# Patient Record
Sex: FEMALE | Race: WHITE | NOT HISPANIC OR LATINO | Employment: UNEMPLOYED | URBAN - METROPOLITAN AREA
[De-identification: names, ages, dates, MRNs, and addresses within clinical notes are randomized per-mention and may not be internally consistent; named-entity substitution may affect disease eponyms.]

---

## 2017-10-08 LAB
INFLUENZA A ANTIGEN, POC: NEGATIVE
INFLUENZA B ANTIGEN, POC: NEGATIVE
RAPID GROUP A STREP (OHS): NEGATIVE

## 2018-08-15 ENCOUNTER — HISTORICAL (OUTPATIENT)
Dept: RADIOLOGY | Facility: HOSPITAL | Age: 58
End: 2018-08-15

## 2019-07-18 ENCOUNTER — HISTORICAL (OUTPATIENT)
Dept: INTERNAL MEDICINE | Facility: CLINIC | Age: 59
End: 2019-07-18

## 2020-12-21 ENCOUNTER — HISTORICAL (OUTPATIENT)
Dept: RADIOLOGY | Facility: HOSPITAL | Age: 60
End: 2020-12-21

## 2022-02-11 ENCOUNTER — HISTORICAL (OUTPATIENT)
Dept: RADIOLOGY | Facility: HOSPITAL | Age: 62
End: 2022-02-11

## 2022-02-11 ENCOUNTER — HISTORICAL (OUTPATIENT)
Dept: ADMINISTRATIVE | Facility: HOSPITAL | Age: 62
End: 2022-02-11

## 2022-04-10 ENCOUNTER — HISTORICAL (OUTPATIENT)
Dept: ADMINISTRATIVE | Facility: HOSPITAL | Age: 62
End: 2022-04-10
Payer: COMMERCIAL

## 2022-04-29 VITALS
BODY MASS INDEX: 28.69 KG/M2 | OXYGEN SATURATION: 97 % | HEIGHT: 65 IN | WEIGHT: 172.19 LBS | SYSTOLIC BLOOD PRESSURE: 129 MMHG | DIASTOLIC BLOOD PRESSURE: 83 MMHG

## 2022-07-23 ENCOUNTER — HOSPITAL ENCOUNTER (EMERGENCY)
Facility: HOSPITAL | Age: 62
Discharge: STILL ADMITTED OR ADMINISTRATIVE | End: 2022-07-24
Attending: FAMILY MEDICINE
Payer: COMMERCIAL

## 2022-07-23 VITALS
RESPIRATION RATE: 20 BRPM | SYSTOLIC BLOOD PRESSURE: 105 MMHG | OXYGEN SATURATION: 95 % | HEART RATE: 67 BPM | TEMPERATURE: 99 F | DIASTOLIC BLOOD PRESSURE: 63 MMHG

## 2022-07-23 DIAGNOSIS — K80.50 BILIARY COLIC: Primary | ICD-10-CM

## 2022-07-23 DIAGNOSIS — R10.11 RUQ ABDOMINAL PAIN: ICD-10-CM

## 2022-07-23 DIAGNOSIS — R74.8 ELEVATED LIVER ENZYMES: ICD-10-CM

## 2022-07-23 LAB
ALBUMIN SERPL-MCNC: 3.4 GM/DL (ref 3.4–4.8)
ALBUMIN/GLOB SERPL: 0.8 RATIO (ref 1.1–2)
ALP SERPL-CCNC: 335 UNIT/L (ref 40–150)
ALT SERPL-CCNC: 276 UNIT/L (ref 0–55)
APAP SERPL-MCNC: <17.4 UG/ML (ref 17.4–30)
APPEARANCE UR: CLEAR
APTT PPP: 29 SECONDS
AST SERPL-CCNC: 118 UNIT/L (ref 5–34)
BACTERIA #/AREA URNS AUTO: ABNORMAL /HPF
BASOPHILS # BLD AUTO: 0.06 X10(3)/MCL (ref 0–0.2)
BASOPHILS NFR BLD AUTO: 1 %
BILIRUB UR QL STRIP.AUTO: ABNORMAL MG/DL
BILIRUBIN DIRECT+TOT PNL SERPL-MCNC: 1.2 MG/DL (ref 0–0.8)
BILIRUBIN DIRECT+TOT PNL SERPL-MCNC: 3.7 MG/DL (ref 0–0.5)
BILIRUBIN DIRECT+TOT PNL SERPL-MCNC: 4.9 MG/DL
BILIRUBIN DIRECT+TOT PNL SERPL-MCNC: 4.9 MG/DL
BUN SERPL-MCNC: 19.4 MG/DL (ref 9.8–20.1)
CALCIUM SERPL-MCNC: 9.7 MG/DL (ref 8.4–10.2)
CHLORIDE SERPL-SCNC: 103 MMOL/L (ref 98–107)
CO2 SERPL-SCNC: 28 MMOL/L (ref 23–31)
COLOR UR AUTO: YELLOW
CREAT SERPL-MCNC: 0.97 MG/DL (ref 0.55–1.02)
EOSINOPHIL # BLD AUTO: 0.5 X10(3)/MCL (ref 0–0.9)
EOSINOPHIL NFR BLD AUTO: 8.3 %
ERYTHROCYTE [DISTWIDTH] IN BLOOD BY AUTOMATED COUNT: 13.2 % (ref 11.5–17)
GLOBULIN SER-MCNC: 4.3 GM/DL (ref 2.4–3.5)
GLUCOSE SERPL-MCNC: 200 MG/DL (ref 82–115)
GLUCOSE UR QL STRIP.AUTO: ABNORMAL MG/DL
HAV IGM SERPL QL IA: NONREACTIVE
HBV CORE IGM SERPL QL IA: NONREACTIVE
HBV SURFACE AG SERPL QL IA: NONREACTIVE
HCT VFR BLD AUTO: 43.3 % (ref 37–47)
HCV AB SERPL QL IA: NONREACTIVE
HGB BLD-MCNC: 13.7 GM/DL (ref 12–16)
HYALINE CASTS #/AREA URNS LPF: ABNORMAL /LPF
IMM GRANULOCYTES # BLD AUTO: 0.02 X10(3)/MCL (ref 0–0.04)
IMM GRANULOCYTES NFR BLD AUTO: 0.3 %
KETONES UR QL STRIP.AUTO: NEGATIVE MG/DL
LEUKOCYTE ESTERASE UR QL STRIP.AUTO: NEGATIVE UNIT/L
LIPASE SERPL-CCNC: 34 U/L
LYMPHOCYTES # BLD AUTO: 1.86 X10(3)/MCL (ref 0.6–4.6)
LYMPHOCYTES NFR BLD AUTO: 30.8 %
MCH RBC QN AUTO: 29.3 PG (ref 27–31)
MCHC RBC AUTO-ENTMCNC: 31.6 MG/DL (ref 33–36)
MCV RBC AUTO: 92.5 FL (ref 80–94)
MONOCYTES # BLD AUTO: 0.48 X10(3)/MCL (ref 0.1–1.3)
MONOCYTES NFR BLD AUTO: 8 %
NEUTROPHILS # BLD AUTO: 3.1 X10(3)/MCL (ref 2.1–9.2)
NEUTROPHILS NFR BLD AUTO: 51.6 %
NITRITE UR QL STRIP.AUTO: NEGATIVE
NRBC BLD AUTO-RTO: 0 %
PH UR STRIP.AUTO: 5.5 [PH]
PLATELET # BLD AUTO: 151 X10(3)/MCL (ref 130–400)
PMV BLD AUTO: 9.7 FL (ref 7.4–10.4)
POTASSIUM SERPL-SCNC: 4.5 MMOL/L (ref 3.5–5.1)
PROT SERPL-MCNC: 7.7 GM/DL (ref 5.8–7.6)
PROT UR QL STRIP.AUTO: NEGATIVE MG/DL
RBC # BLD AUTO: 4.68 X10(6)/MCL (ref 4.2–5.4)
RBC #/AREA URNS AUTO: ABNORMAL /HPF
RBC UR QL AUTO: NEGATIVE UNIT/L
SODIUM SERPL-SCNC: 140 MMOL/L (ref 136–145)
SP GR UR STRIP.AUTO: 1.04
SQUAMOUS #/AREA URNS LPF: ABNORMAL /HPF
UROBILINOGEN UR STRIP-ACNC: NORMAL MG/DL
WBC # SPEC AUTO: 6 X10(3)/MCL (ref 4.5–11.5)
WBC #/AREA URNS AUTO: ABNORMAL /HPF

## 2022-07-23 PROCEDURE — 36415 COLL VENOUS BLD VENIPUNCTURE: CPT | Performed by: FAMILY MEDICINE

## 2022-07-23 PROCEDURE — 82247 BILIRUBIN TOTAL: CPT | Performed by: FAMILY MEDICINE

## 2022-07-23 PROCEDURE — 83690 ASSAY OF LIPASE: CPT | Performed by: FAMILY MEDICINE

## 2022-07-23 PROCEDURE — 63600175 PHARM REV CODE 636 W HCPCS: Performed by: FAMILY MEDICINE

## 2022-07-23 PROCEDURE — 85025 COMPLETE CBC W/AUTO DIFF WBC: CPT | Performed by: FAMILY MEDICINE

## 2022-07-23 PROCEDURE — 85610 PROTHROMBIN TIME: CPT | Performed by: FAMILY MEDICINE

## 2022-07-23 PROCEDURE — 25000003 PHARM REV CODE 250: Performed by: FAMILY MEDICINE

## 2022-07-23 PROCEDURE — 99285 EMERGENCY DEPT VISIT HI MDM: CPT | Mod: 25

## 2022-07-23 PROCEDURE — 96361 HYDRATE IV INFUSION ADD-ON: CPT

## 2022-07-23 PROCEDURE — 80074 ACUTE HEPATITIS PANEL: CPT

## 2022-07-23 PROCEDURE — 96372 THER/PROPH/DIAG INJ SC/IM: CPT | Mod: 59 | Performed by: FAMILY MEDICINE

## 2022-07-23 PROCEDURE — 63600175 PHARM REV CODE 636 W HCPCS

## 2022-07-23 PROCEDURE — 81001 URINALYSIS AUTO W/SCOPE: CPT | Performed by: FAMILY MEDICINE

## 2022-07-23 PROCEDURE — 96365 THER/PROPH/DIAG IV INF INIT: CPT

## 2022-07-23 PROCEDURE — 96376 TX/PRO/DX INJ SAME DRUG ADON: CPT

## 2022-07-23 PROCEDURE — 25000003 PHARM REV CODE 250

## 2022-07-23 PROCEDURE — 80143 DRUG ASSAY ACETAMINOPHEN: CPT | Performed by: FAMILY MEDICINE

## 2022-07-23 PROCEDURE — 96375 TX/PRO/DX INJ NEW DRUG ADDON: CPT

## 2022-07-23 PROCEDURE — 85730 THROMBOPLASTIN TIME PARTIAL: CPT | Performed by: FAMILY MEDICINE

## 2022-07-23 PROCEDURE — 80053 COMPREHEN METABOLIC PANEL: CPT | Performed by: FAMILY MEDICINE

## 2022-07-23 RX ORDER — ONDANSETRON 2 MG/ML
4 INJECTION INTRAMUSCULAR; INTRAVENOUS
Status: COMPLETED | OUTPATIENT
Start: 2022-07-23 | End: 2022-07-23

## 2022-07-23 RX ORDER — KETOROLAC TROMETHAMINE 30 MG/ML
30 INJECTION, SOLUTION INTRAMUSCULAR; INTRAVENOUS
Status: DISCONTINUED | OUTPATIENT
Start: 2022-07-23 | End: 2022-07-23

## 2022-07-23 RX ORDER — DICYCLOMINE HYDROCHLORIDE 10 MG/ML
20 INJECTION INTRAMUSCULAR
Status: COMPLETED | OUTPATIENT
Start: 2022-07-23 | End: 2022-07-23

## 2022-07-23 RX ORDER — KETOROLAC TROMETHAMINE 30 MG/ML
15 INJECTION, SOLUTION INTRAMUSCULAR; INTRAVENOUS
Status: COMPLETED | OUTPATIENT
Start: 2022-07-23 | End: 2022-07-23

## 2022-07-23 RX ADMIN — DICYCLOMINE HYDROCHLORIDE 20 MG: 10 INJECTION, SOLUTION INTRAMUSCULAR at 08:07

## 2022-07-23 RX ADMIN — ONDANSETRON 4 MG: 2 INJECTION INTRAMUSCULAR; INTRAVENOUS at 03:07

## 2022-07-23 RX ADMIN — PROMETHAZINE HYDROCHLORIDE 12.5 MG: 25 INJECTION INTRAMUSCULAR; INTRAVENOUS at 10:07

## 2022-07-23 RX ADMIN — ONDANSETRON 4 MG: 2 INJECTION INTRAMUSCULAR; INTRAVENOUS at 07:07

## 2022-07-23 RX ADMIN — SODIUM CHLORIDE 1000 ML: 9 INJECTION, SOLUTION INTRAVENOUS at 03:07

## 2022-07-23 RX ADMIN — KETOROLAC TROMETHAMINE 15 MG: 30 INJECTION, SOLUTION INTRAMUSCULAR at 07:07

## 2022-07-23 RX ADMIN — SODIUM CHLORIDE 1000 ML: 9 INJECTION, SOLUTION INTRAVENOUS at 07:07

## 2022-07-23 NOTE — ED PROVIDER NOTES
Encounter Date: 7/23/2022       History     Chief Complaint   Patient presents with    Abdominal Pain     Pt c/o RUQ pain, fevers at hs, body aches x 6 days. Jaundice today.      62 y.o. F, w/PMHx of HTN, DM, Hyperlipidemia, presents to the ED complaining of abdominal pain. She states the pain occurred Wednesday that has progressively worsened throughout the rest of the week w/nausea/vomitig with intermittent fevers. She states she tested positive for COVID Wednesday after a home kit and initially contributed her symptoms to COVID despite getting vaccinated. The pain is mainly located in the right upper abdomen that is constant 7/10. The pain is worse with eating. The pain is not improved with tylenol/ibruprofen but improves the fever. The pt also noticed jaundice on her face and eyes w/generalized pruritis, especially in her distal extremities. She admits of passing a stone in her stool several months ago after she examined her stool after a bowel movement. She denies chills, SOB, chest pain, constipation, diarrhea, but admits to fever, dehydration, nausea, vomiting, itchiness, skin color change.     The history is provided by the patient. No  was used.     Review of patient's allergies indicates:   Allergen Reactions    Codeine Anaphylaxis    Erythromycin Nausea And Vomiting     Past Medical History:   Diagnosis Date    Diabetes mellitus     Generalized anxiety disorder     GERD (gastroesophageal reflux disease)     Hypertension     Other hyperlipidemia      History reviewed. No pertinent surgical history.  History reviewed. No pertinent family history.  Social History     Tobacco Use    Smoking status: Never Smoker    Smokeless tobacco: Never Used   Substance Use Topics    Alcohol use: Not Currently    Drug use: Never     Review of Systems   Constitutional: Positive for fever. Negative for chills and diaphoresis.   HENT: Negative for congestion, sinus pressure, sinus pain and sore  throat.    Eyes: Negative for discharge.   Respiratory: Negative for chest tightness, shortness of breath and wheezing.    Cardiovascular: Negative for chest pain, palpitations and leg swelling.   Gastrointestinal: Positive for abdominal pain, nausea and vomiting. Negative for blood in stool, constipation and diarrhea.   Genitourinary: Negative for difficulty urinating and flank pain.   Musculoskeletal: Negative for arthralgias, back pain and neck pain.   Skin:        Itchiness   Neurological: Negative for dizziness, weakness, light-headedness and headaches.   Psychiatric/Behavioral: The patient is not nervous/anxious.        Physical Exam     Initial Vitals [07/23/22 1434]   BP Pulse Resp Temp SpO2   116/75 81 17 97.2 °F (36.2 °C) 98 %      MAP       --         Physical Exam    Nursing note and vitals reviewed.  Constitutional: She appears well-developed.   HENT:   Head: Normocephalic and atraumatic.   Eyes: EOM are normal. Pupils are equal, round, and reactive to light. Scleral icterus (faint jaundice around the lateral sclera both eyes) is present.   Neck: Neck supple.   Normal range of motion.  Cardiovascular: Normal rate, regular rhythm and normal heart sounds.   Pulmonary/Chest: No respiratory distress. She has no wheezes.   Abdominal: Abdomen is soft. Bowel sounds are normal. There is abdominal tenderness (positive brenner's sign).   Musculoskeletal:         General: No tenderness or edema. Normal range of motion.      Cervical back: Normal range of motion and neck supple.     Neurological: She is alert and oriented to person, place, and time. She has normal strength. GCS score is 15. GCS eye subscore is 4. GCS verbal subscore is 5. GCS motor subscore is 6.   Skin: Skin is warm and dry. Capillary refill takes less than 2 seconds. No rash noted. No erythema.   [id number] Spot of jaundice visible in the left foreheard   Psychiatric: She has a normal mood and affect.         ED Course   Procedures  Labs Reviewed    COMPREHENSIVE METABOLIC PANEL - Abnormal; Notable for the following components:       Result Value    Glucose Level 200 (*)     Protein Total 7.7 (*)     Globulin 4.3 (*)     Albumin/Globulin Ratio 0.8 (*)     Bilirubin Total 4.9 (*)     Alkaline Phosphatase 335 (*)     Alanine Aminotransferase 276 (*)     Aspartate Aminotransferase 118 (*)     All other components within normal limits   URINALYSIS, REFLEX TO URINE CULTURE - Abnormal; Notable for the following components:    Glucose, UA 4+ (*)     Bilirubin, UA 1+ (*)     Squamous Epithelial Cells, UA Occ (*)     All other components within normal limits   CBC WITH DIFFERENTIAL - Abnormal; Notable for the following components:    MCHC 31.6 (*)     All other components within normal limits   ACETAMINOPHEN LEVEL - Abnormal; Notable for the following components:    Acetaminophen Level <17.4 (*)     All other components within normal limits   BILIRUBIN, TOTAL AND DIRECT - Abnormal; Notable for the following components:    Bilirubin Total 4.9 (*)     Bilirubin Direct 3.7 (*)     Bilirubin Indirect 1.20 (*)     All other components within normal limits   LIPASE - Normal   APTT - Normal   HEPATITIS PANEL, ACUTE - Normal   CBC W/ AUTO DIFFERENTIAL    Narrative:     The following orders were created for panel order CBC Auto Differential.  Procedure                               Abnormality         Status                     ---------                               -----------         ------                     CBC with Differential[[id number]]        Abnormal            Final result                 Please view results for these tests on the individual orders.   PROTIME-INR   EXTRA TUBES    Narrative:     The following orders were created for panel order EXTRA TUBES.  Procedure                               Abnormality         Status                     ---------                               -----------         ------                     Light Green Top Hold[[id number]]                              In process                 Lavender Top Hold[[id number]]                                In process                 Gold Top Hold[[id number]]                                    In process                   Please view results for these tests on the individual orders.   LIGHT GREEN TOP HOLD   LAVENDER TOP HOLD   GOLD TOP HOLD   PATHOLOGIST INTERPRETATION          Imaging Results          US Abdomen Limited (Final result)  Result time 07/23/22 18:07:45    Final result by Jesse Massey MD (07/23/22 18:07:45)                 Impression:      1.  Hepatomegaly and hepatic steatosis without exclusion of cirrhosis due to heterogeneity.    2.  Ill-defined hypoechoic area adjacent to the gallbladder may represent fatty.      Electronically signed by: Jesse Massey  Date:    07/23/2022  Time:    18:07             Narrative:    EXAMINATION:  US ABDOMEN LIMITED    CLINICAL HISTORY:  Right upper pain.    TECHNIQUE:  Multiple real-time transverse and longitudinal sections were performed of the right abdomen by the sonographer. Select images were submitted for review.    COMPARISON:  None available    FINDINGS:  Liver is enlarged in size and shows diffuse heterogeneous increased echogenicity suggestive of steatosis without exclusion of cirrhosis. There is an ill-defined hypoechoic area adjacent to the gallbladder which is nonspecific and may represent fatty sparing.  Hepatic maximum diameter of 20.3 cm. There was unremarkable hepatopedal flow within the portal vein.  Pancreas is obscured by overlying bowel gas.    Gallbladder is contracted with limited assessment.   The gallbladder neck is also obscured due to adjacent bowel gas.  No definite gallstones identified.  There is no definite gallbladder lumen echogenicity  indicative of sludge or cholelithiasis. Gallbladder wall thickness is within normal limits. Common bile duct caliber of 4.5 mm is within normal limits for the age.    Inferior vena cava and  abdominal aorta are not well seen    Normally located right kidney length measures 11.5 x 4 cm. Right renal corticomedullary differentiation is unremarkable. No evidence of hydronephrosis.                            4:42 PM  Reevaluated pt after giving zofran and fluids. Pt states nausea is improving but still present. Pt is comfortable and not in distress.  6:18 PM  Contacted Gen. Surgery for evaluation for possible cholecystitis. Talked w/pt for imaging results.  7:01 PM  Talked w/Gen. Surgery in-person after their evaluation. Agreed for hepatitis panel and possible admission to medicine for trending labs. Referral to GI.  8:25 PM  Talked w/pt and explained current next steps in management. Pt understands and is comfortable in the room.     Medications   promethazine (PHENERGAN) 12.5 mg in dextrose 5 % 50 mL IVPB (has no administration in time range)   sodium chloride 0.9% bolus 1,000 mL (0 mLs Intravenous Stopped 7/23/22 1615)   ondansetron injection 4 mg (4 mg Intravenous Given 7/23/22 1515)   dicyclomine injection 20 mg (20 mg Intramuscular Given 7/23/22 2000)   ketorolac injection 15 mg (15 mg Intravenous Given 7/23/22 1959)   ondansetron injection 4 mg (4 mg Intravenous Given 7/23/22 1959)   sodium chloride 0.9% bolus 1,000 mL (0 mLs Intravenous Stopped 7/23/22 2058)     Medical Decision Making:   Differential Diagnosis:   Cholecystitis   Cholelithiasis  Choledocholithiasis   ED Management:  Pt arrived to ED complaining of Abdominal pain. She was seen and evaluated in the ED. She was in no acute distress but feeling nauseous. She had areas of jaundice in the skin mainly Left forehead and small spot in her sclera in both eyes. Physical exam showed positive brenner sign. RUQ U/S was ordered for possible cholelithiasis. 1L of NS was give for hydration with zofran IV for nausea. CBC, CMP, bilirubin, UAR, Lipase, coagulation study, acetaminophen levels was ordered. Total, direct, indirect bilirubin, Liver enzymes  were elevated. General surgery was contacted for further evaluation. After evaluating pt, Gen. Surgery reported of nothing acute intervention, possible admit to medicine for trending labs or GI transfer on Tuesday if condition worsens. Hepatitis panel ordered due to elevated LFTs, which were nonreactive. Current plan is to transfer her to [facility] to trend her liver enzymes and bilirubin levels and possible discharge if levels/enzymes are not trending upwards. If they are to do ERCP to assess the bile duct.            Attending Attestation:   Physician Attestation Statement for Resident:  As the supervising MD . -: I have seen and evluated the patient performing my own independent history and physical examination.  I have reviewed the resident's documentation and agree with documentation.  61 y/o female presents with 4-5 day history of RUQ abdominal pain, worse after eating, with associated nausea and vomiting with pain radiating to the right flank.  On physical examination, RUQ abdominal tenderness, +Soto's, no rebound or guarding.  On laboratory evaluation, elevated AST/ALT/ALP/Total bili, with majority being direct concerning for an obstructive type pattern.  US obtained; non-specific.  General Surgery consulted, recommended IM abdmit with GI consult in the event the liver enzymes trend upward, would need ERCP for evaluation.  No GI at this facility for 3 days, therefore recommend transfer to a facility with GI.  Accepted to [facility] for observation admission.    Herber Stephenson MD               ED Course as of 07/23/22 2228   Sat Jul 23, 2022   1812 Patient currently in no distress.  Due to the elevated liver enzymes, ALP, and Total Bili, will consult General Surgery for evaluation. [MW]   1909 General Surgery has seen and evaluated the patient.  Please see consultation note.  Recommends that the patient be admitted to IM with GI consult in the event that enzymes elevate, patient will need an ERCP.   We do not have GI services until 7/26/22 (3 days), therefore will try and transfer the patient to a facility with GI services. [MW]   2376 Discussed with hospitalist at [facility] - will be direct bedded to hospital. [MW]      ED Course User Index  [MW] Herber Stephenson MD           3:35 PM  Pt was reevaluated after initial encounter. Pt is comfortable and is aware of current plan.   4:23 PM  Attending and I Performed bedside ultrasound to evaluate gallbladder before official ultrasound read. Pt is comfortable.  5:40PM  Pt asleep in the room. Still waiting for U/S reading   9:39 PM  Pt states feeling nauseous, given phenergan. Waiting on hospitalist response before transfer.  Clinical Impression:   Final diagnoses:  [K80.50] Biliary colic (Primary)  [R10.11] RUQ abdominal pain  [R74.8] Elevated liver enzymes          ED Disposition Condition    Transfer to Another Facility Stable              Herber Stephenson MD  07/23/22 2671

## 2022-07-24 ENCOUNTER — HOSPITAL ENCOUNTER (INPATIENT)
Facility: HOSPITAL | Age: 62
LOS: 3 days | Discharge: HOME | DRG: 443 | End: 2022-07-27
Attending: INTERNAL MEDICINE | Admitting: INTERNAL MEDICINE
Payer: COMMERCIAL

## 2022-07-24 DIAGNOSIS — E11.9 TYPE 2 DIABETES MELLITUS WITHOUT COMPLICATION, WITH LONG-TERM CURRENT USE OF INSULIN: ICD-10-CM

## 2022-07-24 DIAGNOSIS — R07.9 CHEST PAIN: ICD-10-CM

## 2022-07-24 DIAGNOSIS — K72.00 ACUTE LIVER FAILURE WITHOUT HEPATIC COMA: ICD-10-CM

## 2022-07-24 DIAGNOSIS — E78.5 HYPERLIPIDEMIA, UNSPECIFIED HYPERLIPIDEMIA TYPE: ICD-10-CM

## 2022-07-24 DIAGNOSIS — R10.9 ABDOMINAL PAIN: ICD-10-CM

## 2022-07-24 DIAGNOSIS — E66.9 OBESITY (BMI 30-39.9): ICD-10-CM

## 2022-07-24 DIAGNOSIS — R11.2 INTRACTABLE NAUSEA AND VOMITING: ICD-10-CM

## 2022-07-24 DIAGNOSIS — R74.01 TRANSAMINITIS: Primary | ICD-10-CM

## 2022-07-24 DIAGNOSIS — Z79.4 TYPE 2 DIABETES MELLITUS WITHOUT COMPLICATION, WITH LONG-TERM CURRENT USE OF INSULIN: ICD-10-CM

## 2022-07-24 DIAGNOSIS — K76.0 FATTY LIVER: ICD-10-CM

## 2022-07-24 LAB
ALBUMIN SERPL-MCNC: 2.9 GM/DL (ref 3.4–4.8)
ALBUMIN/GLOB SERPL: 1 RATIO (ref 1.1–2)
ALP SERPL-CCNC: 293 UNIT/L (ref 40–150)
ALT SERPL-CCNC: 234 UNIT/L (ref 0–55)
AST SERPL-CCNC: 134 UNIT/L (ref 5–34)
BILIRUBIN DIRECT+TOT PNL SERPL-MCNC: 4.8 MG/DL
BUN SERPL-MCNC: 15.8 MG/DL (ref 9.8–20.1)
CALCIUM SERPL-MCNC: 8.5 MG/DL (ref 8.4–10.2)
CHLORIDE SERPL-SCNC: 113 MMOL/L (ref 98–107)
CO2 SERPL-SCNC: 22 MMOL/L (ref 23–31)
CREAT SERPL-MCNC: 0.69 MG/DL (ref 0.55–1.02)
GLOBULIN SER-MCNC: 3 GM/DL (ref 2.4–3.5)
GLUCOSE SERPL-MCNC: 123 MG/DL (ref 82–115)
POCT GLUCOSE: 123 MG/DL (ref 70–110)
POCT GLUCOSE: 128 MG/DL (ref 70–110)
POCT GLUCOSE: 143 MG/DL (ref 70–110)
POCT GLUCOSE: 60 MG/DL (ref 70–110)
POCT GLUCOSE: 67 MG/DL (ref 70–110)
POCT GLUCOSE: 73 MG/DL (ref 70–110)
POTASSIUM SERPL-SCNC: 4.3 MMOL/L (ref 3.5–5.1)
PROT SERPL-MCNC: 5.9 GM/DL (ref 5.8–7.6)
SARS-COV-2 RDRP RESP QL NAA+PROBE: NEGATIVE
SODIUM SERPL-SCNC: 142 MMOL/L (ref 136–145)

## 2022-07-24 PROCEDURE — 63600175 PHARM REV CODE 636 W HCPCS: Performed by: INTERNAL MEDICINE

## 2022-07-24 PROCEDURE — 25000003 PHARM REV CODE 250: Performed by: INTERNAL MEDICINE

## 2022-07-24 PROCEDURE — 25000003 PHARM REV CODE 250: Performed by: NURSE PRACTITIONER

## 2022-07-24 PROCEDURE — 87635 SARS-COV-2 COVID-19 AMP PRB: CPT | Performed by: NURSE PRACTITIONER

## 2022-07-24 PROCEDURE — 87040 BLOOD CULTURE FOR BACTERIA: CPT | Performed by: NURSE PRACTITIONER

## 2022-07-24 PROCEDURE — 36415 COLL VENOUS BLD VENIPUNCTURE: CPT | Performed by: NURSE PRACTITIONER

## 2022-07-24 PROCEDURE — 25500020 PHARM REV CODE 255: Performed by: INTERNAL MEDICINE

## 2022-07-24 PROCEDURE — 63600175 PHARM REV CODE 636 W HCPCS: Performed by: NURSE PRACTITIONER

## 2022-07-24 PROCEDURE — 11000001 HC ACUTE MED/SURG PRIVATE ROOM

## 2022-07-24 PROCEDURE — 80053 COMPREHEN METABOLIC PANEL: CPT | Performed by: NURSE PRACTITIONER

## 2022-07-24 RX ORDER — IBUPROFEN 200 MG
16 TABLET ORAL
Status: DISCONTINUED | OUTPATIENT
Start: 2022-07-24 | End: 2022-07-27 | Stop reason: HOSPADM

## 2022-07-24 RX ORDER — ACETAMINOPHEN 325 MG/1
650 TABLET ORAL EVERY 6 HOURS PRN
Status: DISCONTINUED | OUTPATIENT
Start: 2022-07-24 | End: 2022-07-27 | Stop reason: HOSPADM

## 2022-07-24 RX ORDER — NALOXONE HCL 0.4 MG/ML
0.02 VIAL (ML) INJECTION
Status: DISCONTINUED | OUTPATIENT
Start: 2022-07-24 | End: 2022-07-27 | Stop reason: HOSPADM

## 2022-07-24 RX ORDER — ATORVASTATIN CALCIUM 40 MG/1
40 TABLET, FILM COATED ORAL NIGHTLY
COMMUNITY
End: 2022-07-27

## 2022-07-24 RX ORDER — LISINOPRIL 40 MG/1
40 TABLET ORAL DAILY
COMMUNITY

## 2022-07-24 RX ORDER — SODIUM CHLORIDE 0.9 % (FLUSH) 0.9 %
10 SYRINGE (ML) INJECTION EVERY 12 HOURS PRN
Status: DISCONTINUED | OUTPATIENT
Start: 2022-07-24 | End: 2022-07-27 | Stop reason: HOSPADM

## 2022-07-24 RX ORDER — PANTOPRAZOLE SODIUM 40 MG/1
40 TABLET, DELAYED RELEASE ORAL DAILY
Status: DISCONTINUED | OUTPATIENT
Start: 2022-07-24 | End: 2022-07-26

## 2022-07-24 RX ORDER — INSULIN GLARGINE AND LIXISENATIDE 100; 33 U/ML; UG/ML
40 INJECTION, SOLUTION SUBCUTANEOUS NIGHTLY
COMMUNITY

## 2022-07-24 RX ORDER — SIMETHICONE 80 MG
1 TABLET,CHEWABLE ORAL 4 TIMES DAILY PRN
Status: DISCONTINUED | OUTPATIENT
Start: 2022-07-24 | End: 2022-07-27 | Stop reason: HOSPADM

## 2022-07-24 RX ORDER — IBUPROFEN 200 MG
24 TABLET ORAL
Status: DISCONTINUED | OUTPATIENT
Start: 2022-07-24 | End: 2022-07-27 | Stop reason: HOSPADM

## 2022-07-24 RX ORDER — OMEPRAZOLE 40 MG/1
40 CAPSULE, DELAYED RELEASE ORAL EVERY OTHER DAY
COMMUNITY

## 2022-07-24 RX ORDER — VIT C/E/ZN/COPPR/LUTEIN/ZEAXAN 250MG-90MG
2000 CAPSULE ORAL DAILY
COMMUNITY

## 2022-07-24 RX ORDER — ENOXAPARIN SODIUM 100 MG/ML
40 INJECTION SUBCUTANEOUS EVERY 24 HOURS
Status: DISCONTINUED | OUTPATIENT
Start: 2022-07-24 | End: 2022-07-27 | Stop reason: HOSPADM

## 2022-07-24 RX ORDER — GLUCAGON 1 MG
1 KIT INJECTION
Status: DISCONTINUED | OUTPATIENT
Start: 2022-07-24 | End: 2022-07-27 | Stop reason: HOSPADM

## 2022-07-24 RX ORDER — PROMETHAZINE HYDROCHLORIDE 25 MG/ML
25 INJECTION, SOLUTION INTRAMUSCULAR; INTRAVENOUS EVERY 6 HOURS PRN
Status: DISCONTINUED | OUTPATIENT
Start: 2022-07-24 | End: 2022-07-27 | Stop reason: HOSPADM

## 2022-07-24 RX ORDER — ONDANSETRON 2 MG/ML
4 INJECTION INTRAMUSCULAR; INTRAVENOUS EVERY 6 HOURS PRN
Status: DISCONTINUED | OUTPATIENT
Start: 2022-07-24 | End: 2022-07-27 | Stop reason: HOSPADM

## 2022-07-24 RX ORDER — GLIPIZIDE 10 MG/1
20 TABLET ORAL
COMMUNITY

## 2022-07-24 RX ORDER — TALC
6 POWDER (GRAM) TOPICAL NIGHTLY PRN
Status: DISCONTINUED | OUTPATIENT
Start: 2022-07-24 | End: 2022-07-27 | Stop reason: HOSPADM

## 2022-07-24 RX ORDER — INSULIN ASPART 100 [IU]/ML
0-5 INJECTION, SOLUTION INTRAVENOUS; SUBCUTANEOUS
Status: DISCONTINUED | OUTPATIENT
Start: 2022-07-24 | End: 2022-07-27 | Stop reason: HOSPADM

## 2022-07-24 RX ORDER — DAPAGLIFLOZIN 10 MG/1
10 TABLET, FILM COATED ORAL DAILY
COMMUNITY
End: 2022-10-10 | Stop reason: ALTCHOICE

## 2022-07-24 RX ORDER — DIPHENHYDRAMINE HYDROCHLORIDE 50 MG/ML
25 INJECTION INTRAMUSCULAR; INTRAVENOUS EVERY 6 HOURS PRN
Status: DISCONTINUED | OUTPATIENT
Start: 2022-07-24 | End: 2022-07-27 | Stop reason: HOSPADM

## 2022-07-24 RX ORDER — BUSPIRONE HYDROCHLORIDE 7.5 MG/1
7.5 TABLET ORAL DAILY
COMMUNITY
End: 2022-10-10 | Stop reason: ALTCHOICE

## 2022-07-24 RX ORDER — SODIUM CHLORIDE, SODIUM LACTATE, POTASSIUM CHLORIDE, CALCIUM CHLORIDE 600; 310; 30; 20 MG/100ML; MG/100ML; MG/100ML; MG/100ML
INJECTION, SOLUTION INTRAVENOUS CONTINUOUS
Status: DISCONTINUED | OUTPATIENT
Start: 2022-07-24 | End: 2022-07-26

## 2022-07-24 RX ORDER — CETIRIZINE HYDROCHLORIDE 10 MG/1
10 TABLET ORAL DAILY
COMMUNITY

## 2022-07-24 RX ADMIN — PANTOPRAZOLE SODIUM 40 MG: 40 TABLET, DELAYED RELEASE ORAL at 10:07

## 2022-07-24 RX ADMIN — IOPAMIDOL 100 ML: 755 INJECTION, SOLUTION INTRAVENOUS at 12:07

## 2022-07-24 RX ADMIN — Medication 16 G: at 12:07

## 2022-07-24 RX ADMIN — SODIUM CHLORIDE, POTASSIUM CHLORIDE, SODIUM LACTATE AND CALCIUM CHLORIDE: 600; 310; 30; 20 INJECTION, SOLUTION INTRAVENOUS at 10:07

## 2022-07-24 RX ADMIN — PROMETHAZINE HYDROCHLORIDE 25 MG: 25 INJECTION INTRAMUSCULAR; INTRAVENOUS at 07:07

## 2022-07-24 RX ADMIN — ACETAMINOPHEN 650 MG: 325 TABLET ORAL at 07:07

## 2022-07-24 RX ADMIN — ENOXAPARIN SODIUM 40 MG: 40 INJECTION SUBCUTANEOUS at 05:07

## 2022-07-24 RX ADMIN — ACETAMINOPHEN 650 MG: 325 TABLET ORAL at 11:07

## 2022-07-24 RX ADMIN — BUSPIRONE HYDROCHLORIDE 7.5 MG: 5 TABLET ORAL at 05:07

## 2022-07-24 NOTE — H&P
"[facility] Hospital Medicine History & Physical Examination       Patient Name: Livier Jung  MRN: [id number]  Patient Class: IP- Inpatient   Admission Date: 7/24/2022   Admitting Physician: [facility]   Length of Stay: 0  Attending Physician: Dr. Parker Machado  Primary Care Provider: Primary Doctor No  Face-to-Face encounter date: 07/24/2022  Code Status:Full code  Chief Complaint: Abdominal pain, nausea, vomiting, jaundice      Patient information was obtained from patient, patient's family, past medical records and ER records.     HISTORY OF PRESENT ILLNESS:   Livier Jung is a 62 y.o. female who has a PMH which includes HTN, GERD, DM, HLD; presented to the ED at [facility] on 7/23/2022 with complaints of upper epigastric abdominal pain with associated nausea with vomiting with fever of 102F. NO reports of diarrhea, cough, congestion, chest pain, SOB, recent travel or any sick contacts. Pt reports pain is primarily in her RUQ constant but fluctuates in intensity. No reports of any prior episodes. Surgery services consulted per ED provider at [facility] which she was evaluated by surgery team; which per their assessment and plan low suspicion for "primary gallbladder pathology given negative gallbladder findings on US, normal WBC, benign abdominal exam and cholestatic picture with out CBD dilatation". Recommendations for GI consultation and evaluation. GI services not available at [facility] at this time. Pt was transferred to [facility] for GI services and further evaluation. Pt reports her sympstom have been present since Monday 7/18/2022. It was reported per ED provider note pt took a home COVID test which she reported was positive; pt reports she took home test which was negative. She reports she has been vaccinated .Pt also reported yellowing of her eyes and skin with generalized itching. She denies any heavy alcohol use, denies any drug abuse. Denies any past history of hepatitis or exposure. Labs " significant for glucose 200, alkaline phosphatase 335, T bili 4.9, direct bili 3.7, indirect bili 1.20; , ; other indices unremarkable.  Hepatitis A, hepatitis B, hepatitis C all non-reactive. Abdominal IS revealed hepatomegaly and hepatic steatosis without exclusion of cirrhosis due to heterogeneity, ill defined hypoechoic area adjacent to the gallbladder may represent fatty. GI services consulted. Pt is admitted to hospital medicine services for further management.     PAST MEDICAL HISTORY:   HTN  HLD  DM  GERD  Anxiety    PAST SURGICAL HISTORY:   Back surgery  Neck surgery  Left elbow surgery  Hysterectomy  Tonsillectomy    ALLERGIES:   Codeine and Erythromycin    FAMILY HISTORY:   Reviewed and negative    SOCIAL HISTORY:     Social History     Tobacco Use    Smoking status: Never Smoker    Smokeless tobacco: Never Used   Substance Use Topics    Alcohol use: Not Currently        HOME MEDICATIONS:   As documented  Prior to Admission medications    Medication Sig Start Date End Date Taking? Authorizing Provider   atorvastatin (LIPITOR) 40 MG tablet Take 40 mg by mouth once daily.   Yes Historical Provider   busPIRone (BUSPAR) 7.5 MG tablet Take 7.5 mg by mouth Daily.   Yes Historical Provider   cetirizine (ZYRTEC) 10 MG tablet Take 10 mg by mouth once daily.   Yes Historical Provider   dapagliflozin (FARXIGA) 10 mg tablet Take 10 mg by mouth once daily.   Yes Historical Provider   glipiZIDE (GLUCOTROL) 10 MG tablet Take 10 mg by mouth 2 (two) times daily before meals.   Yes Historical Provider   lisinopriL (PRINIVIL,ZESTRIL) 40 MG tablet Take 40 mg by mouth once daily.   Yes Historical Provider   omeprazole (PRILOSEC) 40 MG capsule Take 40 mg by mouth once daily.   Yes Historical Provider       REVIEW OF SYSTEMS:   Except as documented, all other systems reviewed and negative     PHYSICAL EXAM:     VITAL SIGNS: 24 HRS MIN & MAX LAST   Temp  Min: 97.2 °F (36.2 °C)  Max: 98.8 °F (37.1 °C) 98.4 °F  (36.9 °C)   BP  Min: 93/53  Max: 129/71 121/73   Pulse  Min: 60  Max: 81  60   Resp  Min: 17  Max: 20 18   SpO2  Min: 93 %  Max: 98 % 96 %       General appearance: Well-developed, well-nourished appears  stated age, non-toxic female, complaints of mild abdomdinal pain, no family at the bedside  HENT: Atraumatic head. Moist mucous membranes of oral cavity.Sclera icteric  Eyes: Normal extraocular movements, PERRL.   Neck: Supple, trachea midline.   Lungs: Clear to auscultation bilaterally. No wheezing present.  Symmetrical expansion, unlabored respirations O2 saturation stable  Heart: Regular rate and rhythm.  Capillary refill brisk, peripheral pulses palpable   Abdomen: Soft, non-distended, epigastric tenderness with tenderness to right upper quadrant; no rebound tenderness, bowel sounds are normal.   Extremities: JEWELL; generalized weakness  Skin:warm and dry, mild jaundice to forehead  Neuro: Motor and sensory exams grossly intact. No focal deficits, AAOx3  Psych/mental status: Appropriate mood and affect. Responds appropriately to questions.     LABS AND IMAGING:     Recent Labs   Lab 07/23/22  1451   WBC 6.0   RBC 4.68   HGB 13.7   HCT 43.3   MCV 92.5   MCH 29.3   MCHC 31.6*   RDW 13.2      MPV 9.7       Recent Labs   Lab 07/23/22  1451 07/24/22  0606    142   K 4.5 4.3   CO2 28 22*   BUN 19.4 15.8   CREATININE 0.97 0.69   CALCIUM 9.7 8.5   ALBUMIN 3.4 2.9*   ALKPHOS 335* 293*   * 234*   * 134*   BILITOT 4.9*  4.9* 4.8*       Microbiology Results (last 7 days)     Procedure Component Value Units Date/Time    Blood Culture (site 1) [[id number]]     Order Status: Sent Specimen: Blood     Blood Culture (site 2) [[id number]]     Order Status: Sent Specimen: Blood            US Abdomen Limited  Narrative: EXAMINATION:  US ABDOMEN LIMITED    CLINICAL HISTORY:  Right upper pain.    TECHNIQUE:  Multiple real-time transverse and longitudinal sections were performed of the right abdomen by the  sonographer. Select images were submitted for review.    COMPARISON:  None available    FINDINGS:  Liver is enlarged in size and shows diffuse heterogeneous increased echogenicity suggestive of steatosis without exclusion of cirrhosis. There is an ill-defined hypoechoic area adjacent to the gallbladder which is nonspecific and may represent fatty sparing.  Hepatic maximum diameter of 20.3 cm. There was unremarkable hepatopedal flow within the portal vein.  Pancreas is obscured by overlying bowel gas.    Gallbladder is contracted with limited assessment.   The gallbladder neck is also obscured due to adjacent bowel gas.  No definite gallstones identified.  There is no definite gallbladder lumen echogenicity  indicative of sludge or cholelithiasis. Gallbladder wall thickness is within normal limits. Common bile duct caliber of 4.5 mm is within normal limits for the age.    Inferior vena cava and abdominal aorta are not well seen    Normally located right kidney length measures 11.5 x 4 cm. Right renal corticomedullary differentiation is unremarkable. No evidence of hydronephrosis.  Impression: 1.  Hepatomegaly and hepatic steatosis without exclusion of cirrhosis due to heterogeneity.    2.  Ill-defined hypoechoic area adjacent to the gallbladder may represent fatty.    Electronically signed by: Jesse Massey  Date:    07/23/2022  Time:    18:07        ASSESSMENT & PLAN:   ASSESSMENT:  Acute liver failure- without hepatic encephalopathy  Acute hepatitis- unknown etiology  Jaundice- secondary to acute hepatitis  Nausea without vomiting  Abdominal pain- RUQ  Fever-subjective likely secondary to GI etiology  Hyperbilirubinemia-likely secondary to acute liver failure/acute hepatitis  Generalized pruritis likely secondary to acute liver failure  DM type 2 with hyperglycemia  HTN-essential  Fever-subjective likely secondary to GI etiology  HLD- on statin therapy  Weakness    PLAN;  Consult GI Services appreciate assistance  and recommendations  NPO status until patient is evaluated by GI Services okay for p.o. meds  IV hydration LR @ 100  Accu-Cheks sliding scale  CT of the abdomen and pelvis without contrast  COVID PCR test for confirmation of negative status  Resume home medication as deemed necessary  Labs in the am  GI prophylaxis  PRN pain management  PRN anti-emetic therapy  PRN benadryl for itching        VTE Prophylaxis: Lovenox for DVT prophylaxis and will be advised to be as mobile as possible     Patient condition:  Fair  __________________________________________________________________________  INPATIENT LIST OF MEDICATIONS     Scheduled Meds: Lovenox 40 mg SQ, Protonix 40 mg IVP daily, see MAR  Continuous Infusions:LR @ 100  PRN Meds:.dextrose 10%, dextrose 10%, dextrose 10%, dextrose 10%, glucagon (human recombinant), glucose, glucose, glucose, glucose, insulin aspart U-100, melatonin, naloxone, ondansetron, promethazine, simethicone, sodium chloride 0.9%, sodium chloride 0.9%      IMaura FNP have reviewed and discussed the case with Dr. Parker Machado.  Please see the following addendum for further assessment and plan from there attending MD.  ZEYNEP Son   07/24/2022

## 2022-07-24 NOTE — CONSULTS
GI CONSULT      Reason for Consultation: RUQ pain, elevated liver enzymes,       HPI:   62 year old woman with htn, gerd, dm, obesity, who presented to [facility] with RUQ pain, nausea and vomiting and fever of 102.  She states she has had some discomfort over the past week, but because worse yesterday.  She went to [facility] and then was transferred to [facility].     She does state she had some dark urine about two days ago. She was found to have increased bili up to 4 in the ER.  Hep panel was negative. She had US that showed hepatic steatosis and contracted gb.  CT shows GB with pericholecystic fluid.         She denies covid positivity last week.        Review of patient's allergies indicates:   Allergen Reactions    Codeine Anaphylaxis    Erythromycin Nausea And Vomiting          Current Facility-Administered Medications   Medication Dose Route Frequency Provider Last Rate Last Admin    acetaminophen tablet 650 mg  650 mg Oral Q6H PRN Álvaro Bruce MD   650 mg at 07/24/22 1155    busPIRone split tablet 7.5 mg  7.5 mg Oral Daily Parker Machado MD        dextrose 10% bolus 125 mL  12.5 g Intravenous PRN Álvaro Hernandez MD        dextrose 10% bolus 125 mL  12.5 g Intravenous PRN Maura Downey, SVETAP        dextrose 10% bolus 250 mL  25 g Intravenous PRN Álvaro Hernandez MD        dextrose 10% bolus 250 mL  25 g Intravenous PRN Maura Downey, ZEYNEP        diphenhydrAMINE injection 25 mg  25 mg Intravenous Q6H PRN Maura Downey, ZEYNEP        enoxaparin injection 40 mg  40 mg Subcutaneous Daily ZEYNEP Son        glucagon (human recombinant) injection 1 mg  1 mg Intramuscular PRN Álvaro Hernandez MD        glucose chewable tablet 16 g  16 g Oral PRN Álvaro Hernandez MD        glucose chewable tablet 16 g  16 g Oral PRN FELIBERTO Auguste-BC   16 g at 07/24/22 1204    glucose chewable tablet 24 g  24 g Oral PRN Álvaro Hernandez MD         glucose chewable tablet 24 g  24 g Oral PRN Jennifer Reyes AGACNP-BC        insulin aspart U-100 injection 0-5 Units  0-5 Units Subcutaneous QID (AC + HS) PRN Álvaro Hernandez MD        lactated ringers infusion   Intravenous Continuous SVETA SonP 100 mL/hr at 07/24/22 1025 New Bag at 07/24/22 1025    melatonin tablet 6 mg  6 mg Oral Nightly PRN ZEYNEP Son        naloxone 0.4 mg/mL injection 0.02 mg  0.02 mg Intravenous PRN Jennifer Reyes AGACNP-BC        ondansetron injection 4 mg  4 mg Intravenous Q6H PRN ZEYNEP Son        pantoprazole EC tablet 40 mg  40 mg Oral Daily Maura Downey FNP   40 mg at 07/24/22 1025    promethazine injection 25 mg  25 mg Intramuscular Q6H PRN Álvaro Hernandez MD        simethicone chewable tablet 80 mg  1 tablet Oral QID PRN ZEYNEP Son        sodium chloride 0.9% flush 10 mL  10 mL Intravenous Q12H PRN CHELE AugusteCNP-BC        sodium chloride 0.9% flush 10 mL  10 mL Intravenous Q12H PRN ZEYNEP Son           Medications Prior to Admission   Medication Sig Dispense Refill Last Dose    atorvastatin (LIPITOR) 40 MG tablet Take 40 mg by mouth every evening.   Past Week at Unknown time    busPIRone (BUSPAR) 7.5 MG tablet Take 7.5 mg by mouth Daily.   Past Week at Unknown time    cetirizine (ZYRTEC) 10 MG tablet Take 10 mg by mouth once daily.   Past Week at Unknown time    cholecalciferol, vitamin D3, (VITAMIN D3) 25 mcg (1,000 unit) capsule Take 2,000 Units by mouth once daily.   Past Week    dapagliflozin (FARXIGA) 10 mg tablet Take 10 mg by mouth once daily.   Past Week at Unknown time    glipiZIDE (GLUCOTROL) 10 MG tablet Take 20 mg by mouth daily with breakfast.   Past Week at Unknown time    insulin glargine-lixisenatide (SOLIQUA 100/33) 100 unit-33 mcg/mL InPn pen Inject 40 Units into the skin every evening.   Past Week    lisinopriL (PRINIVIL,ZESTRIL) 40  MG tablet Take 40 mg by mouth once daily.   Past Week at Unknown time    omeprazole (PRILOSEC) 40 MG capsule Take 40 mg by mouth once daily.   Past Week at Unknown time         Past Medical History:    Past Medical History:   Diagnosis Date    Diabetes mellitus     Generalized anxiety disorder     GERD (gastroesophageal reflux disease)     Hypertension     Other hyperlipidemia         Past Surgical History:    No past surgical history on file.     Family History:    No family history on file.    Social History:    Social History     Tobacco Use    Smoking status: Never Smoker    Smokeless tobacco: Never Used   Substance Use Topics    Alcohol use: Not Currently            Review of Systems:    Review of Systems   Constitutional: Positive for fever.   HENT: Negative.    Eyes: Negative.    Respiratory: Negative.    Cardiovascular: Negative.    Gastrointestinal: Positive for abdominal pain and nausea.   Genitourinary: Negative.    Musculoskeletal: Negative.    Skin: Negative.    Neurological: Negative.    Endo/Heme/Allergies: Negative.    Psychiatric/Behavioral: Negative.          Objective:        VITALS:     Temp Readings from Last 3 Encounters:   07/24/22 98.5 °F (36.9 °C) (Oral)   07/23/22 98.8 °F (37.1 °C) (Oral)     BP Readings from Last 3 Encounters:   07/24/22 122/76   07/23/22 105/63   06/26/19 129/83     Pulse Readings from Last 3 Encounters:   07/24/22 63   07/23/22 67            Intake/Output Summary (Last 24 hours) at 7/24/2022 1558  Last data filed at 7/24/2022 1300  Gross per 24 hour   Intake 0 ml   Output --   Net 0 ml         Physical Exam  Constitutional:       Appearance: Normal appearance. She is obese.   HENT:      Head: Normocephalic and atraumatic.      Nose: Nose normal.      Mouth/Throat:      Mouth: Mucous membranes are moist.   Eyes:      Extraocular Movements: Extraocular movements intact.   Cardiovascular:      Rate and Rhythm: Normal rate.      Pulses: Normal pulses.   Pulmonary:       Effort: Pulmonary effort is normal. No respiratory distress.      Breath sounds: No wheezing or rales.   Abdominal:      General: Bowel sounds are normal.      Palpations: Abdomen is soft.      Tenderness: There is abdominal tenderness. There is no guarding or rebound.      Hernia: No hernia is present.      Comments: Pt Tender in RUQ and epigastric area.    Skin:     General: Skin is warm.   Neurological:      Mental Status: She is alert and oriented to person, place, and time.   Psychiatric:         Mood and Affect: Mood normal.         Behavior: Behavior normal.             Recent Results (from the past 48 hour(s))   Urinalysis, Reflex to Urine Culture Urine, Clean Catch    Collection Time: 07/23/22  2:50 PM    Specimen: Urine   Result Value Ref Range    Color, UA Yellow Yellow, Colorless, Other, Clear    Appearance, UA Clear Clear    Specific Gravity, UA 1.038     pH, UA 5.5 5.0, 5.5, 6.0, 6.5, 7.0, 7.5, 8.0, 8.5    Protein, UA Negative Negative, 300  mg/dL    Glucose, UA 4+ (A) Negative, Normal mg/dL    Ketones, UA Negative Negative, +1, +2, +3, +4, +5, >=160, >=80 mg/dL    Blood, UA Negative Negative unit/L    Bilirubin, UA 1+ (A) Negative mg/dL    Urobilinogen, UA Normal 0.2, 1.0, Normal mg/dL    Nitrites, UA Negative Negative    Leukocyte Esterase, UA Negative Negative, 75  unit/L    WBC, UA 0-5 None Seen, 0-2, 3-5, 0-5 /HPF    Bacteria, UA None Seen None Seen /HPF    Squamous Epithelial Cells, UA Occ (A) None Seen /HPF    Hyaline Casts, UA None Seen None Seen /lpf    RBC, UA 0-5 None Seen, 0-2, 3-5, 0-5 /HPF   Comprehensive Metabolic Panel    Collection Time: 07/23/22  2:51 PM   Result Value Ref Range    Sodium Level 140 136 - 145 mmol/L    Potassium Level 4.5 3.5 - 5.1 mmol/L    Chloride 103 98 - 107 mmol/L    Carbon Dioxide 28 23 - 31 mmol/L    Glucose Level 200 (H) 82 - 115 mg/dL    Blood Urea Nitrogen 19.4 9.8 - 20.1 mg/dL    Creatinine 0.97 0.55 - 1.02 mg/dL    Calcium Level Total 9.7 8.4 - 10.2  mg/dL    Protein Total 7.7 (H) 5.8 - 7.6 gm/dL    Albumin Level 3.4 3.4 - 4.8 gm/dL    Globulin 4.3 (H) 2.4 - 3.5 gm/dL    Albumin/Globulin Ratio 0.8 (L) 1.1 - 2.0 ratio    Bilirubin Total 4.9 (H) <=1.5 mg/dL    Alkaline Phosphatase 335 (H) 40 - 150 unit/L    Alanine Aminotransferase 276 (H) 0 - 55 unit/L    Aspartate Aminotransferase 118 (H) 5 - 34 unit/L    Estimated GFR-Non  >60 mls/min/1.73/m2   Lipase    Collection Time: 07/23/22  2:51 PM   Result Value Ref Range    Lipase Level 34 <=60 U/L   Protime-INR    Collection Time: 07/23/22  2:51 PM   Result Value Ref Range    PT 12.5 seconds    INR 0.95 0.00 - 1.30   APTT    Collection Time: 07/23/22  2:51 PM   Result Value Ref Range    PTT 29.0 <150.0 seconds   CBC with Differential    Collection Time: 07/23/22  2:51 PM   Result Value Ref Range    WBC 6.0 4.5 - 11.5 x10(3)/mcL    RBC 4.68 4.20 - 5.40 x10(6)/mcL    Hgb 13.7 12.0 - 16.0 gm/dL    Hct 43.3 37.0 - 47.0 %    MCV 92.5 80.0 - 94.0 fL    MCH 29.3 27.0 - 31.0 pg    MCHC 31.6 (L) 33.0 - 36.0 mg/dL    RDW 13.2 11.5 - 17.0 %    Platelet 151 130 - 400 x10(3)/mcL    MPV 9.7 7.4 - 10.4 fL    Neut % 51.6 %    Lymph % 30.8 %    Mono % 8.0 %    Eos % 8.3 %    Basophil % 1.0 %    Lymph # 1.86 0.6 - 4.6 x10(3)/mcL    Neut # 3.1 2.1 - 9.2 x10(3)/mcL    Mono # 0.48 0.1 - 1.3 x10(3)/mcL    Eos # 0.50 0 - 0.9 x10(3)/mcL    Baso # 0.06 0 - 0.2 x10(3)/mcL    IG# 0.02 0 - 0.04 x10(3)/mcL    IG% 0.3 %    NRBC% 0.0 %   Acetaminophen Level    Collection Time: 07/23/22  2:51 PM   Result Value Ref Range    Acetaminophen Level <17.4 (L) 17.4 - 30.0 ug/ml   Bilirubin, Total and Direct    Collection Time: 07/23/22  2:51 PM   Result Value Ref Range    Bilirubin Total 4.9 (H) <=1.5 mg/dL    Bilirubin Direct 3.7 (H) 0.0 - 0.5 mg/dL    Bilirubin Indirect 1.20 (H) 0.00 - 0.80 mg/dL   Hepatitis Panel, Acute    Collection Time: 07/23/22  2:57 PM   Result Value Ref Range    Hepatitis A IgM Nonreactive Nonreactive    Hepatitis B  Core IgM Nonreactive Nonreactive    Hepatitis B Surface Antigen Nonreactive Nonreactive    Hepatitis C Antibody Nonreactive Nonreactive   POCT glucose    Collection Time: 07/24/22  5:04 AM   Result Value Ref Range    POCT Glucose 60 (L) 70 - 110 mg/dL   Comprehensive Metabolic Panel (CMP)    Collection Time: 07/24/22  6:06 AM   Result Value Ref Range    Sodium Level 142 136 - 145 mmol/L    Potassium Level 4.3 3.5 - 5.1 mmol/L    Chloride 113 (H) 98 - 107 mmol/L    Carbon Dioxide 22 (L) 23 - 31 mmol/L    Glucose Level 123 (H) 82 - 115 mg/dL    Blood Urea Nitrogen 15.8 9.8 - 20.1 mg/dL    Creatinine 0.69 0.55 - 1.02 mg/dL    Calcium Level Total 8.5 8.4 - 10.2 mg/dL    Protein Total 5.9 5.8 - 7.6 gm/dL    Albumin Level 2.9 (L) 3.4 - 4.8 gm/dL    Globulin 3.0 2.4 - 3.5 gm/dL    Albumin/Globulin Ratio 1.0 (L) 1.1 - 2.0 ratio    Bilirubin Total 4.8 (H) <=1.5 mg/dL    Alkaline Phosphatase 293 (H) 40 - 150 unit/L    Alanine Aminotransferase 234 (H) 0 - 55 unit/L    Aspartate Aminotransferase 134 (H) 5 - 34 unit/L    Estimated GFR-Non  >60 mls/min/1.73/m2   POCT glucose    Collection Time: 07/24/22  6:20 AM   Result Value Ref Range    POCT Glucose 128 (H) 70 - 110 mg/dL   COVID-19 Rapid Screening    Collection Time: 07/24/22 10:15 AM   Result Value Ref Range    SARS COV-2 MOLECULAR Negative Negative   POCT glucose    Collection Time: 07/24/22 11:59 AM   Result Value Ref Range    POCT Glucose 67 (L) 70 - 110 mg/dL   POCT glucose    Collection Time: 07/24/22  1:12 PM   Result Value Ref Range    POCT Glucose 123 (H) 70 - 110 mg/dL           CT Abdomen Pelvis With Contrast    Result Date: 7/24/2022  EXAMINATION: CT ABDOMEN PELVIS WITH CONTRAST CLINICAL HISTORY: RUQ pain, elevated LFt;s; TECHNIQUE: Low dose axial images, sagittal and coronal reformations were obtained from the lung bases to the pubic symphysis following the IV administration of contrast COMPARISON: None. FINDINGS: The lung bases are clear. The  liver appears normal.  No liver mass or lesion is seen.  Portal and hepatic veins appear normal. The gallbladder is contracted and there is some pericholecystic fluid seen.  No obvious gallstones are seen.. The pancreas appears normal.  No pancreatic mass or lesion is seen. The spleen shows no acute abnormality. The adrenal glands appear normal.  No adrenal nodule is seen. The kidneys appear normal.  No hydronephrosis is seen.  No hydroureter is seen.  No nephrolithiasis is seen.  No obvious ureteral stones are seen. Urinary bladder appears grossly unremarkable. Reproductive structures appear grossly unremarkable. No colitis is seen.  No diverticulitis is seen.  No obvious colonic mass or lesion is seen. No free air is seen.  No free fluid is seen.     Contracted gallbladder with pericholecystic fluid is seen.  If gallbladder pathology is suspected ultrasound correlation is recommended Otherwise unremarkable Electronically signed by: Gisel Beltran Date:    07/24/2022 Time:    13:04    US Abdomen Limited    Result Date: 7/23/2022  EXAMINATION: US ABDOMEN LIMITED CLINICAL HISTORY: Right upper pain. TECHNIQUE: Multiple real-time transverse and longitudinal sections were performed of the right abdomen by the sonographer. Select images were submitted for review. COMPARISON: None available FINDINGS: Liver is enlarged in size and shows diffuse heterogeneous increased echogenicity suggestive of steatosis without exclusion of cirrhosis. There is an ill-defined hypoechoic area adjacent to the gallbladder which is nonspecific and may represent fatty sparing.  Hepatic maximum diameter of 20.3 cm. There was unremarkable hepatopedal flow within the portal vein.  Pancreas is obscured by overlying bowel gas. Gallbladder is contracted with limited assessment.   The gallbladder neck is also obscured due to adjacent bowel gas.  No definite gallstones identified.  There is no definite gallbladder lumen echogenicity  indicative of  sludge or cholelithiasis. Gallbladder wall thickness is within normal limits. Common bile duct caliber of 4.5 mm is within normal limits for the age. Inferior vena cava and abdominal aorta are not well seen Normally located right kidney length measures 11.5 x 4 cm. Right renal corticomedullary differentiation is unremarkable. No evidence of hydronephrosis.     1.  Hepatomegaly and hepatic steatosis without exclusion of cirrhosis due to heterogeneity. 2.  Ill-defined hypoechoic area adjacent to the gallbladder may represent fatty. Electronically signed by: Jesse Massey Date:    07/23/2022 Time:    18:07            Assessment & Plan:    62 year old woman with htn, gerd, dm, obesity, who presented to [facility] with RUQ pain, nausea and vomiting and fever of 102.  She states she has had some discomfort over the past week, but because worse yesterday.  She went to [facility] and then was transferred to [facility].     1. Elevated LFT's---likely secondary to gb inflammation.  Cannot rule out cbd stone yet.  Duct was not dilated, but will get MRCP.  Will trend lft's.  If she spikes a fever, low threshold to start antibx.   Check liver serology.         2. RUQ pain---pt with TTP in RUQ.  Her gb to me on CT scan looks abnormal.  I recommend surgical consultation for this.  I will order MRCP to make sure bile duct has not stones, etc.  But I suspect her symptomatology is biliary related.

## 2022-07-24 NOTE — CONSULTS
[facility] - [facility]  Consult Note     Subjective:     HPI:   61 yo F presenting with upper abdominal pain since Monday. She reports subjective nausea without emesis and fever up to 102F. Normal bowel function. No recent sick contacts, travel or hospitalizations. No prior episodes of biliary colic or similar RUQ pain.      PMH:  Past Medical History:   Diagnosis Date    Diabetes mellitus     Generalized anxiety disorder     GERD (gastroesophageal reflux disease)     Hypertension     Other hyperlipidemia          PSH:  History reviewed. No pertinent surgical history.      Medications:  No current facility-administered medications on file prior to encounter.     No current outpatient medications on file prior to encounter.        Allergies:  Review of patient's allergies indicates:   Allergen Reactions    Codeine Anaphylaxis    Erythromycin Nausea And Vomiting         Social Hx:  Social History     Tobacco Use   Smoking Status Never Smoker   Smokeless Tobacco Never Used      Social History     Substance and Sexual Activity   Alcohol Use Not Currently         Relevant Family Hx:  History reviewed. No pertinent family history.      Objective:     Physical Exam:  Gen: NAD  Neuro: awake, alert, answering questions appropriately  CV: RRR  Resp: non-labored breathing, ANDREI  Abd: soft, ND, NT  Ext: moves all 4 spontaneously and purposefully  Skin: warm, well perfused     Labs:  WBC wnl  Bili 4.9 (Direct 3.7; Indirect 1.2)  AST//118  Alk phos 335  Lipase 34       Imaging:  RUQ Ultrasound with contracted gallbladder, no stones, no pericholecystic fluid, no wall thickening, normal CBD 4.5mm     Assessment/Plan:  61 yo F presenting with acute episode of abdominal pain and fevers    - Discussed with ED attending low suspicion for primary gallbladder pathology given negative gallbladder findings on ultrasound, normal WBC, benign abdominal exam, and cholestatic picture without CBD dilation  - Strongly  recommend GI consultation and medicine evaluation. GI unavailable until Tuesday so discussed possible transfer outside this facility for further workup.   - Hepatitis panel pending  - Surgery will follow if patient admitted     Clarice Ellis MD   [facility], PGY3  07/23/2022 7:02 PM

## 2022-07-25 LAB
ALBUMIN SERPL-MCNC: 2.9 GM/DL (ref 3.4–4.8)
ALBUMIN/GLOB SERPL: 0.9 RATIO (ref 1.1–2)
ALP SERPL-CCNC: 320 UNIT/L (ref 40–150)
ALT SERPL-CCNC: 233 UNIT/L (ref 0–55)
AST SERPL-CCNC: 146 UNIT/L (ref 5–34)
BASOPHILS # BLD AUTO: 0.05 X10(3)/MCL (ref 0–0.2)
BASOPHILS NFR BLD AUTO: 0.9 %
BILIRUBIN DIRECT+TOT PNL SERPL-MCNC: 4 MG/DL
BUN SERPL-MCNC: 8.7 MG/DL (ref 9.8–20.1)
CALCIUM SERPL-MCNC: 9 MG/DL (ref 8.4–10.2)
CHLORIDE SERPL-SCNC: 110 MMOL/L (ref 98–107)
CO2 SERPL-SCNC: 26 MMOL/L (ref 23–31)
CREAT SERPL-MCNC: 0.63 MG/DL (ref 0.55–1.02)
EOSINOPHIL # BLD AUTO: 0.56 X10(3)/MCL (ref 0–0.9)
EOSINOPHIL NFR BLD AUTO: 10.5 %
ERYTHROCYTE [DISTWIDTH] IN BLOOD BY AUTOMATED COUNT: 13.8 % (ref 11.5–17)
GLOBULIN SER-MCNC: 3.1 GM/DL (ref 2.4–3.5)
GLUCOSE SERPL-MCNC: 102 MG/DL (ref 82–115)
GLUCOSE SERPL-MCNC: 121 MG/DL (ref 70–110)
GLUCOSE SERPL-MCNC: 133 MG/DL (ref 70–110)
HCT VFR BLD AUTO: 38.9 % (ref 37–47)
HGB BLD-MCNC: 12.1 GM/DL (ref 12–16)
IMM GRANULOCYTES # BLD AUTO: 0.03 X10(3)/MCL (ref 0–0.04)
IMM GRANULOCYTES NFR BLD AUTO: 0.6 %
LYMPHOCYTES # BLD AUTO: 2.38 X10(3)/MCL (ref 0.6–4.6)
LYMPHOCYTES NFR BLD AUTO: 44.7 %
MCH RBC QN AUTO: 29.5 PG (ref 27–31)
MCHC RBC AUTO-ENTMCNC: 31.1 MG/DL (ref 33–36)
MCV RBC AUTO: 94.9 FL (ref 80–94)
MONOCYTES # BLD AUTO: 0.41 X10(3)/MCL (ref 0.1–1.3)
MONOCYTES NFR BLD AUTO: 7.7 %
NEUTROPHILS # BLD AUTO: 1.9 X10(3)/MCL (ref 2.1–9.2)
NEUTROPHILS NFR BLD AUTO: 35.6 %
NRBC BLD AUTO-RTO: 0 %
PLATELET # BLD AUTO: 152 X10(3)/MCL (ref 130–400)
PMV BLD AUTO: 10.1 FL (ref 7.4–10.4)
POCT GLUCOSE: 101 MG/DL (ref 70–110)
POCT GLUCOSE: 120 MG/DL (ref 70–110)
POCT GLUCOSE: 133 MG/DL (ref 70–110)
POTASSIUM SERPL-SCNC: 4.7 MMOL/L (ref 3.5–5.1)
PROT SERPL-MCNC: 6 GM/DL (ref 5.8–7.6)
RBC # BLD AUTO: 4.1 X10(6)/MCL (ref 4.2–5.4)
SODIUM SERPL-SCNC: 143 MMOL/L (ref 136–145)
WBC # SPEC AUTO: 5.3 X10(3)/MCL (ref 4.5–11.5)

## 2022-07-25 PROCEDURE — 85025 COMPLETE CBC W/AUTO DIFF WBC: CPT | Performed by: NURSE PRACTITIONER

## 2022-07-25 PROCEDURE — 36415 COLL VENOUS BLD VENIPUNCTURE: CPT | Performed by: INTERNAL MEDICINE

## 2022-07-25 PROCEDURE — 63600175 PHARM REV CODE 636 W HCPCS: Performed by: INTERNAL MEDICINE

## 2022-07-25 PROCEDURE — 25000003 PHARM REV CODE 250: Performed by: NURSE PRACTITIONER

## 2022-07-25 PROCEDURE — 63600175 PHARM REV CODE 636 W HCPCS: Performed by: NURSE PRACTITIONER

## 2022-07-25 PROCEDURE — 80053 COMPREHEN METABOLIC PANEL: CPT | Performed by: NURSE PRACTITIONER

## 2022-07-25 PROCEDURE — 11000001 HC ACUTE MED/SURG PRIVATE ROOM

## 2022-07-25 RX ADMIN — PROMETHAZINE HYDROCHLORIDE 25 MG: 25 INJECTION INTRAMUSCULAR; INTRAVENOUS at 09:07

## 2022-07-25 RX ADMIN — ENOXAPARIN SODIUM 40 MG: 40 INJECTION SUBCUTANEOUS at 04:07

## 2022-07-25 RX ADMIN — PANTOPRAZOLE SODIUM 40 MG: 40 TABLET, DELAYED RELEASE ORAL at 08:07

## 2022-07-25 RX ADMIN — SODIUM CHLORIDE, POTASSIUM CHLORIDE, SODIUM LACTATE AND CALCIUM CHLORIDE: 600; 310; 30; 20 INJECTION, SOLUTION INTRAVENOUS at 12:07

## 2022-07-25 NOTE — PLAN OF CARE
07/25/22 1250   Discharge Assessment   Assessment Type Discharge Planning Assessment   Confirmed/corrected address, phone number and insurance Yes   Confirmed Demographics Correct on Facesheet   Source of Information patient   Communicated AIMEE with patient/caregiver Date not available/Unable to determine   Reason For Admission GI services   Lives With alone   Do you expect to return to your current living situation? Yes   Do you have help at home or someone to help you manage your care at home? Yes   Who are your caregiver(s) and their phone number(s)? Kiki Jung [phone] daughter   Prior to hospitilization cognitive status: Alert/Oriented   Current cognitive status: Alert/Oriented   Walking or Climbing Stairs Difficulty none   Dressing/Bathing Difficulty none   Home Layout Able to live on 1st floor   Equipment Currently Used at Home none   Readmission within 30 days? Yes   Patient currently being followed by outpatient case management? No   Do you currently have service(s) that help you manage your care at home? No   Do you take prescription medications? Yes   Do you have prescription coverage? No   Do you have any problems affording any of your prescribed medications? TBD   Is the patient taking medications as prescribed? yes   Who is going to help you get home at discharge? Kiki Jung [phone] daughter   How do you get to doctors appointments? car, drives self   Are you on dialysis? No   Do you take coumadin? No   Discharge Plan A Home   Discharge Plan B Home   DME Needed Upon Discharge  none   Discharge Plan discussed with: Patient   Discharge Barriers Identified Unable to afford medication;Other (see comments)  (Starts new job 8/1/22 has no insurance presently is concerned about cost of meds at discharge)

## 2022-07-25 NOTE — PROGRESS NOTES
[facility]  Hospital Medicine Progress Note        Chief Complaint: Inpatient Follow-up for jaundice, upper abdominal pain    HPI: 62-year-old female with known past medical history of hypertension, hyperlipidemia, GERD, diabetes mellitus type 2, obesity with BMI 30.2, admitted to [facility] on 07/23/2022 with complaint of upper epigastric and right upper quadrant pain associated with nausea and vomiting and fever of 102° at home.  Patient reported that all her symptoms started last Monday when she started having growing in her stomach but no diarrhea.  Next day she developed upper abdominal pain and right upper quadrant pain with nausea and then she started having fever and vomiting.  Denies any sick contact or any food that she ate recently or any new supplements on medication that she started except for amlodipine that was recently changed from lisinopril for better blood pressure control by her PCP 4 weeks ago.   Acuity, general surgery was consulted and they had a very low suspicion for primary gallbladder pathology given negative gallbladder findings on ultrasound, normal WBC and benign abdominal examination.   Surgery recommended patient be evaluated by GI.  GI service was not available at [facility] till this coming Tuesday so she was transferred to [facility] for GI service and evaluation.   Patient states that later that this past week she noticed that her eyes started to become yellow and scan also changed the color, her urine is very dark grayish yellow and she had generalized itching.  Patient denies alcohol use, states that she uses so sparingly or occasionally that it is cannot be quantified.   Reports she has been working hard towards her diabetic control for the last few months and it has been running okay.  She does not know her A1c.   Hepatitis-A panel was done which is negative for a B and C, abdominal ultrasound reveals hepatomegaly and hepatic steatosis without exclusion of  cirrhosis due to heterogenicity, ill-defined hypoechoic area adjacent to gallbladder may suggest fatty infiltrate.  GI service is consulted and patient is admitted to our service for further evaluation     Interval Hx:   Ambulatory in the room, reports feels okay.  Stated that urine is still dark blood stools are pale.  No nausea or vomiting.   Daughter is at bedside  Patient informs me that she already had MRCP last night, informed her that the results were pending  Discussed GI has consulted surgery, informed that the resident has come and seen her but she is awaiting attending to around  I answered all their questions to the best of my knowledge in their satisfaction  Case discussed with patient's nurse on the floor      Objective/physical exam:  General: In no acute distress, afebrile, obese, scleral icterus with jaundice    Chest: Clear to auscultation bilaterally to the bases   Heart: S1, S2, no appreciable murmur   Abdomen:   mild tenderness to palpation in the right upper quadrant, epigastrium.  No tenderness on the left side though   MSK: Warm, no lower extremity edema, no clubbing or cyanosis   Neurologic: Alert and oriented x4, moving all extremities with good strength    Psych:  Appropriate mood and affect, cooperative     VITAL SIGNS: 24 HRS MIN & MAX LAST   Temp  Min: 97.9 °F (36.6 °C)  Max: 98.5 °F (36.9 °C) 98.1 °F (36.7 °C)   BP  Min: 102/58  Max: 136/71 136/71   Pulse  Min: 53  Max: 63  (!) 59   Resp  Min: 18  Max: 18 18   SpO2  Min: 96 %  Max: 97 % 97 %       Recent Labs   Lab 07/23/22  1451 07/25/22  0411   WBC 6.0 5.3   RBC 4.68 4.10*   HGB 13.7 12.1   HCT 43.3 38.9   MCV 92.5 94.9*   MCH 29.3 29.5   MCHC 31.6* 31.1*   RDW 13.2 13.8    152   MPV 9.7 10.1       Recent Labs   Lab 07/23/22  1451 07/24/22  0606 07/25/22  0411    142 143   K 4.5 4.3 4.7   CO2 28 22* 26   BUN 19.4 15.8 8.7*   CREATININE 0.97 0.69 0.63   CALCIUM 9.7 8.5 9.0   ALBUMIN 3.4 2.9* 2.9*   ALKPHOS 335* 293* 320*    * 234* 233*   * 134* 146*   BILITOT 4.9*  4.9* 4.8* 4.0*          Microbiology Results (last 7 days)     Procedure Component Value Units Date/Time    Blood Culture (site 2) [[id number]] Collected: 07/24/22 1018    Order Status: Resulted Specimen: Blood Updated: 07/24/22 1116    Blood Culture (site 1) [[id number]] Collected: 07/24/22 1018    Order Status: Resulted Specimen: Blood Updated: 07/24/22 1114           See below for Radiology    Scheduled Med:   busPIRone  7.5 mg Oral Daily    enoxaparin  40 mg Subcutaneous Daily    pantoprazole  40 mg Oral Daily        Continuous Infusions:   lactated ringers 100 mL/hr at 07/24/22 1025        PRN Meds:  acetaminophen, dextrose 10%, dextrose 10%, dextrose 10%, dextrose 10%, diphenhydrAMINE, glucagon (human recombinant), glucose, glucose, glucose, glucose, insulin aspart U-100, melatonin, naloxone, ondansetron, promethazine, simethicone, sodium chloride 0.9%, sodium chloride 0.9%       Assessment/Plan:  Acute liver failure- without hepatic encephalopathy   Acute hepatitis- unknown etiology- Viral hep panel negative    Probable obstructive Jaundice- secondary to acute hepatitis / cholecystitis  Nausea without vomiting- improving    Abdominal pain- RUQ/ Epigastric    Fever-subjective likely secondary to GI etiology   Hyperbilirubinemia-likely secondary to acute liver failure/acute hepatitis   Generalized pruritis likely secondary to acute liver failure   DM type 2 with hyperglycemia   HTN-essential   HLD- on statin therapy           Admitted to hospitalist services inpatient on 07/24/2022   Consulted GI Service, Dr Bruce evaluated the pt, appreciate recs   Recommended and ordered surgical eval- surgery evaluated the patient, ordered HIDA scan  MRCP showed nonspecific pericholecystic edema with no gallstones seen.  No biliary ductal dilation.  Hepatomegaly with steatosis  Follow-up on results of NOVA, AMA, actin smooth muscle antibody IgG  GI also  recommended that patient can follow up as outpatient for further workup if no surgical intervention required by surgery team  Continue IV hydration LR @ 100   Accu-Cheks sliding scale   CT of the abdomen and pelvis without contrast--> Contracted gallbladder with pericholecystic fluid is seen.  If gallbladder pathology is suspected ultrasound correlation is recommended. Otherwise unremarkable   COVID negative  Resumed appropriate home medication for chronic medical condition. Hold antiHTN (BP being on the lower side) and Statin and oral hypoglycemic agents for now   GI prophylaxis with pantoprazole    PRN pain management   PRN anti-emetic therapy   PRN benadryl for itching   Morning CMP ordered        VTE Prophylaxis: Lovenox for DVT prophylaxis and will be advised to be as mobile as possible        Patient condition: Fair        Discharge Planning and Disposition/Anticipated discharge: 1-2 days       All diagnosis and differential diagnosis have been reviewed; assessment and plan has been documented; I have personally reviewed the labs and test results that are presently available; I have reviewed the patients medication list; I have reviewed the consulting providers response and recommendations. I have reviewed or attempted to review medical records based upon their availability    All of the patient's questions have been  addressed and answered. Patient's is agreeable to the above stated plan. I will continue to monitor closely and make adjustments to medical management as needed.  _____________________________________________________________________    Nutrition Status:    Radiology:  CT Abdomen Pelvis With Contrast  Narrative: EXAMINATION:  CT ABDOMEN PELVIS WITH CONTRAST    CLINICAL HISTORY:  RUQ pain, elevated LFt;s;    TECHNIQUE:  Low dose axial images, sagittal and coronal reformations were obtained from the lung bases to the pubic symphysis following the IV administration of  contrast    COMPARISON:  None.    FINDINGS:  The lung bases are clear.    The liver appears normal.  No liver mass or lesion is seen.  Portal and hepatic veins appear normal.    The gallbladder is contracted and there is some pericholecystic fluid seen.  No obvious gallstones are seen..    The pancreas appears normal.  No pancreatic mass or lesion is seen.    The spleen shows no acute abnormality.    The adrenal glands appear normal.  No adrenal nodule is seen.    The kidneys appear normal.  No hydronephrosis is seen.  No hydroureter is seen.  No nephrolithiasis is seen.  No obvious ureteral stones are seen.    Urinary bladder appears grossly unremarkable.    Reproductive structures appear grossly unremarkable.    No colitis is seen.  No diverticulitis is seen.  No obvious colonic mass or lesion is seen.    No free air is seen.  No free fluid is seen.  Impression: Contracted gallbladder with pericholecystic fluid is seen.  If gallbladder pathology is suspected ultrasound correlation is recommended    Otherwise unremarkable    Electronically signed by: Gisel Beltran  Date:    07/24/2022  Time:    13:04      Parker Machado MD   07/25/2022

## 2022-07-25 NOTE — PROGRESS NOTES
"Gastroenterology Progress Note    Subjective:    Still reporting upper abdominal pain. NPO for HIDA scan. Reporting having a grey stool this AM and 12 lb weight loss x 1 week.    Objective:    ROS:    ROS      Vital Signs:  /70 (BP Location: Right arm, Patient Position: Lying)   Pulse (!) 57   Temp 98.3 °F (36.8 °C) (Oral)   Resp 18   Ht 5' 3" (1.6 m)   Wt 77.6 kg (171 lb)   SpO2 95%   BMI 30.29 kg/m²   Body mass index is 30.29 kg/m².    Physical Exam:    Physical Exam  Constitutional:       General: She is not in acute distress.  Cardiovascular:      Rate and Rhythm: Normal rate and regular rhythm.   Pulmonary:      Effort: Pulmonary effort is normal. No respiratory distress.   Abdominal:      General: Bowel sounds are normal. There is no distension.      Palpations: Abdomen is soft.      Tenderness: There is abdominal tenderness (RUQ).   Musculoskeletal:         General: Normal range of motion.   Skin:     General: Skin is warm and dry.   Neurological:      Mental Status: She is alert and oriented to person, place, and time.   Psychiatric:         Mood and Affect: Mood normal.         Behavior: Behavior normal.         Labs:  Recent Results (from the past 48 hour(s))   POCT glucose    Collection Time: 07/24/22  5:04 AM   Result Value Ref Range    POCT Glucose 60 (L) 70 - 110 mg/dL   Comprehensive Metabolic Panel (CMP)    Collection Time: 07/24/22  6:06 AM   Result Value Ref Range    Sodium Level 142 136 - 145 mmol/L    Potassium Level 4.3 3.5 - 5.1 mmol/L    Chloride 113 (H) 98 - 107 mmol/L    Carbon Dioxide 22 (L) 23 - 31 mmol/L    Glucose Level 123 (H) 82 - 115 mg/dL    Blood Urea Nitrogen 15.8 9.8 - 20.1 mg/dL    Creatinine 0.69 0.55 - 1.02 mg/dL    Calcium Level Total 8.5 8.4 - 10.2 mg/dL    Protein Total 5.9 5.8 - 7.6 gm/dL    Albumin Level 2.9 (L) 3.4 - 4.8 gm/dL    Globulin 3.0 2.4 - 3.5 gm/dL    Albumin/Globulin Ratio 1.0 (L) 1.1 - 2.0 ratio    Bilirubin Total 4.8 (H) <=1.5 mg/dL    Alkaline " Phosphatase 293 (H) 40 - 150 unit/L    Alanine Aminotransferase 234 (H) 0 - 55 unit/L    Aspartate Aminotransferase 134 (H) 5 - 34 unit/L    Estimated GFR-Non  >60 mls/min/1.73/m2   POCT glucose    Collection Time: 07/24/22  6:20 AM   Result Value Ref Range    POCT Glucose 128 (H) 70 - 110 mg/dL   COVID-19 Rapid Screening    Collection Time: 07/24/22 10:15 AM   Result Value Ref Range    SARS COV-2 MOLECULAR Negative Negative   Blood Culture (site 1)    Collection Time: 07/24/22 10:18 AM    Specimen: Blood   Result Value Ref Range    CULTURE, BLOOD ([facility]) No Growth At 24 Hours    Blood Culture (site 2)    Collection Time: 07/24/22 10:18 AM    Specimen: Blood   Result Value Ref Range    CULTURE, BLOOD ([facility]) No Growth At 24 Hours    POCT glucose    Collection Time: 07/24/22 11:59 AM   Result Value Ref Range    POCT Glucose 67 (L) 70 - 110 mg/dL   POCT glucose    Collection Time: 07/24/22  1:12 PM   Result Value Ref Range    POCT Glucose 123 (H) 70 - 110 mg/dL   POCT glucose    Collection Time: 07/24/22  4:10 PM   Result Value Ref Range    POCT Glucose 73 70 - 110 mg/dL   POCT glucose    Collection Time: 07/24/22  8:26 PM   Result Value Ref Range    POCT Glucose 143 (H) 70 - 110 mg/dL   POCT glucose    Collection Time: 07/25/22  4:07 AM   Result Value Ref Range    POCT Glucose 101 70 - 110 mg/dL   Comprehensive Metabolic Panel (CMP)    Collection Time: 07/25/22  4:11 AM   Result Value Ref Range    Sodium Level 143 136 - 145 mmol/L    Potassium Level 4.7 3.5 - 5.1 mmol/L    Chloride 110 (H) 98 - 107 mmol/L    Carbon Dioxide 26 23 - 31 mmol/L    Glucose Level 102 82 - 115 mg/dL    Blood Urea Nitrogen 8.7 (L) 9.8 - 20.1 mg/dL    Creatinine 0.63 0.55 - 1.02 mg/dL    Calcium Level Total 9.0 8.4 - 10.2 mg/dL    Protein Total 6.0 5.8 - 7.6 gm/dL    Albumin Level 2.9 (L) 3.4 - 4.8 gm/dL    Globulin 3.1 2.4 - 3.5 gm/dL    Albumin/Globulin Ratio 0.9 (L) 1.1 - 2.0 ratio    Bilirubin Total 4.0 (H) <=1.5 mg/dL     Alkaline Phosphatase 320 (H) 40 - 150 unit/L    Alanine Aminotransferase 233 (H) 0 - 55 unit/L    Aspartate Aminotransferase 146 (H) 5 - 34 unit/L    Estimated GFR-Non  >60 mls/min/1.73/m2   CBC with Differential    Collection Time: 07/25/22  4:11 AM   Result Value Ref Range    WBC 5.3 4.5 - 11.5 x10(3)/mcL    RBC 4.10 (L) 4.20 - 5.40 x10(6)/mcL    Hgb 12.1 12.0 - 16.0 gm/dL    Hct 38.9 37.0 - 47.0 %    MCV 94.9 (H) 80.0 - 94.0 fL    MCH 29.5 27.0 - 31.0 pg    MCHC 31.1 (L) 33.0 - 36.0 mg/dL    RDW 13.8 11.5 - 17.0 %    Platelet 152 130 - 400 x10(3)/mcL    MPV 10.1 7.4 - 10.4 fL    Neut % 35.6 %    Lymph % 44.7 %    Mono % 7.7 %    Eos % 10.5 %    Basophil % 0.9 %    Lymph # 2.38 0.6 - 4.6 x10(3)/mcL    Neut # 1.9 (L) 2.1 - 9.2 x10(3)/mcL    Mono # 0.41 0.1 - 1.3 x10(3)/mcL    Eos # 0.56 0 - 0.9 x10(3)/mcL    Baso # 0.05 0 - 0.2 x10(3)/mcL    IG# 0.03 0 - 0.04 x10(3)/mcL    IG% 0.6 %    NRBC% 0.0 %       Imaging:  Procedure Component Value Units Date/Time   MRI MRCP Without Contrast [[id number]] Resulted: 07/25/22 0943   Order Status: Completed Updated: 07/25/22 0946   Narrative:     EXAMINATION:   MRI ABDOMEN WITHOUT CONTRAST MRCP     CLINICAL HISTORY:   Jaundice;     TECHNIQUE:   Multiplanar multisequence MRI of the abdomen performed without gadolinium based IV contrast according to MRCP protocol.     COMPARISON:   CT earlier the same day     FINDINGS:   Liver: Liver is enlarged with the right hepatic lobe measuring 23 cm craniocaudad.  There is moderate hepatic steatosis.  No focal suspicious liver lesion is seen on this noncontrast exam.     Gallbladder and biliary tree: No gallstones are seen.  Gallbladder is largely contracted which accentuates wall thickness.  There is mild pericholecystic edema.  The common bile duct is not significantly dilated, measures approximately 5 mm maximally.     Spleen: Normal.     Pancreas: No peripancreatic inflammation.  The main pancreatic duct is not dilated.      Adrenals: Normal.     Kidneys: No hydronephrosis.  No suspicious renal lesion.     GI tract: No obstruction.     Retroperitoneum: The abdominal aorta is normal in caliber. There is no retroperitoneal adenopathy.     No ascites.  No pleural or pericardial effusion.    Impression:       1. Nonspecific pericholecystic edema with no gallstones seen.   2. No biliary ductal dilatation.   3. Hepatomegaly with steatosis.       Electronically signed by: Andrew Avila   Date: 07/25/2022   Time: 09:43         Assessment/Plan:  1. Abdominal pain    2. Chest pain       62 year old woman with htn, gerd, dm, obesity, who presented to [facility] with RUQ pain, nausea and vomiting and fever of 102.  She states she has had some discomfort over the past week, but because worse yesterday.  She went to [facility] and then was transferred to [facility].      1. Elevated LFT's  2. RUQ pain  3. Pale stool  -Trend LFTs  -MRCP negative for obstruction  -F/u liver serology (NOVA, AMA, etc). Unremarkable acetaminophen level and acute viral hepatitis panel  -F/u HIDA scan and surgery's recommendations  -Only new medication at home was amlodipine, which was started around 1 month ago. Symptoms started 2 week ago. She occasionally drinks, no foreign travel, sick contacts, herbal supplements, excessive Tylenol, or smoking.    4. Hepatomegaly  5. Hepatic steatosis  -F/u GI as outpatient    Gary Sanches PA-C     Yes

## 2022-07-25 NOTE — CONSULTS
HISTORY & PHYSICAL  Trauma and Acute Care Surgery    Patient Name: Livier Jung  YOB: 1960    Date: 07/25/2022                     PRESENTING HISTORY     Chief Complaint/Reason for Admission: <principal problem not specified>    History of Present Illness:  Ms. Livier Jung is a 62 y.o. female w/pmhx of DM, HTN, and GERD who presented for 1wk of RUQ pain, nausea, and vomiting w/concurrent fever of 102. She denies ever having symptoms like this before. She reports her urine has been dark in color during this time as well. Her abdominal discomfort failed to improve through the week, and she presented to [facility] and then transferred to [facility] for GI evaluation.  Due to concerns for possible cholecystitis General Surgery was consulted.      Review of Systems:  12 point ROS negative except as stated in HPI    PAST HISTORY:     Past Medical History:   Diagnosis Date    Diabetes mellitus     Generalized anxiety disorder     GERD (gastroesophageal reflux disease)     Hypertension     Other hyperlipidemia        No past surgical history on file.    No family history on file.    Social History     Socioeconomic History    Marital status: Single   Tobacco Use    Smoking status: Never Smoker    Smokeless tobacco: Never Used   Substance and Sexual Activity    Alcohol use: Not Currently    Drug use: Never    Sexual activity: Not Currently       MEDICATIONS & ALLERGIES:     No current facility-administered medications on file prior to encounter.     Current Outpatient Medications on File Prior to Encounter   Medication Sig    atorvastatin (LIPITOR) 40 MG tablet Take 40 mg by mouth every evening.    busPIRone (BUSPAR) 7.5 MG tablet Take 7.5 mg by mouth Daily.    cetirizine (ZYRTEC) 10 MG tablet Take 10 mg by mouth once daily.    cholecalciferol, vitamin D3, (VITAMIN D3) 25 mcg (1,000 unit) capsule Take 2,000 Units by mouth once daily.    dapagliflozin (FARXIGA) 10 mg tablet Take 10 mg by mouth once  daily.    glipiZIDE (GLUCOTROL) 10 MG tablet Take 20 mg by mouth daily with breakfast.    insulin glargine-lixisenatide (SOLIQUA 100/33) 100 unit-33 mcg/mL InPn pen Inject 40 Units into the skin every evening.    lisinopriL (PRINIVIL,ZESTRIL) 40 MG tablet Take 40 mg by mouth once daily.    omeprazole (PRILOSEC) 40 MG capsule Take 40 mg by mouth once daily.       Review of patient's allergies indicates:   Allergen Reactions    Codeine Anaphylaxis    Erythromycin Nausea And Vomiting       OBJECTIVE:     Vital Signs:  Temp:  [97.9 °F (36.6 °C)-98.5 °F (36.9 °C)] 98.1 °F (36.7 °C)  Pulse:  [53-63] 59  Resp:  [18] 18  SpO2:  [96 %-97 %] 97 %  BP: (102-136)/(58-76) 136/71  Body mass index is 30.29 kg/m².     Physical Exam:  General:  Well developed, well nourished, no acute distress  HEENT:  Normocephalic, atraumatic  CVS: RRR  Resp:  NWOB on room air  GI: S, ND. TTP in RUQ  :  Deferred  MSK:  No muscle atrophy, cyanosis, peripheral edema, full range of motion  Skin:  No rashes, ulcers, erythema  Neuro:  CNII-XII grossly intact  Psych:  Alert and oriented to person, place, and time    Laboratory  Troponin:  No results for input(s): TROPONINI in the last 72 hours.  CBC:  Recent Labs     07/23/22  1451 07/25/22  0411   WBC 6.0 5.3   RBC 4.68 4.10*   HGB 13.7 12.1   HCT 43.3 38.9    152   MCV 92.5 94.9*   MCH 29.3 29.5   MCHC 31.6* 31.1*     CMP:  Recent Labs     07/23/22  1451 07/24/22  0606 07/25/22  0411   CALCIUM 9.7 8.5 9.0   ALBUMIN 3.4 2.9* 2.9*    142 143   K 4.5 4.3 4.7   CO2 28 22* 26   BUN 19.4 15.8 8.7*   CREATININE 0.97 0.69 0.63   ALKPHOS 335* 293* 320*   * 234* 233*   * 134* 146*   BILITOT 4.9*  4.9* 4.8* 4.0*     Lactic Acid:  Invalid input(s): LACTATIC  Etoh:  No results for input(s): ALCOHOLMEDIC in the last 72 hours.  Drug Screen:  No results for input(s): PCDSCOMETHA, COCAINEMETAB, OPIATESCREEN, BARBITURATES, AMPHETAMINES, MARIJUANATHC, PCDSOPHENCYN, CREATRANDUR,  TOXINFO in the last 72 hours.      ABG:  No results for input(s): PH, PCO2, PO2, HCO3, BE, POCSATURATED in the last 72 hours.    Diagnostic Results:        ASSESSMENT & PLAN:   Ms. Livier Jung is a 62 y.o. female w/hyperbilirubinemia presenting with RUQ pain.    Plan:  - Will order HIDA to rule out cholecystitis.  - Recommend continued work-up by GI for other etiologies of elevated bilirubin given negative work-up for cholecystitis so far.        Gary Clifton  Trauma/Acute Care Surgery     7/25/2022  7:06 AM

## 2022-07-26 LAB
ALBUMIN SERPL-MCNC: 2.9 GM/DL (ref 3.4–4.8)
ALBUMIN/GLOB SERPL: 0.9 RATIO (ref 1.1–2)
ALP SERPL-CCNC: 376 UNIT/L (ref 40–150)
ALT SERPL-CCNC: 194 UNIT/L (ref 0–55)
AST SERPL-CCNC: 111 UNIT/L (ref 5–34)
BILIRUBIN DIRECT+TOT PNL SERPL-MCNC: 2.5 MG/DL
BUN SERPL-MCNC: 9 MG/DL (ref 9.8–20.1)
CALCIUM SERPL-MCNC: 8.9 MG/DL (ref 8.4–10.2)
CANCER AG19-9 SERPL-ACNC: 24.59 UNIT/ML (ref 0–37)
CHLORIDE SERPL-SCNC: 105 MMOL/L (ref 98–107)
CO2 SERPL-SCNC: 28 MMOL/L (ref 23–31)
CREAT SERPL-MCNC: 0.62 MG/DL (ref 0.55–1.02)
GLOBULIN SER-MCNC: 3.3 GM/DL (ref 2.4–3.5)
GLUCOSE SERPL-MCNC: 173 MG/DL (ref 82–115)
PATH REV: NORMAL
POCT GLUCOSE: 133 MG/DL (ref 70–110)
POCT GLUCOSE: 153 MG/DL (ref 70–110)
POCT GLUCOSE: 166 MG/DL (ref 70–110)
POCT GLUCOSE: 195 MG/DL (ref 70–110)
POTASSIUM SERPL-SCNC: 4.3 MMOL/L (ref 3.5–5.1)
PROT SERPL-MCNC: 6.2 GM/DL (ref 5.8–7.6)
SODIUM SERPL-SCNC: 140 MMOL/L (ref 136–145)

## 2022-07-26 PROCEDURE — 80053 COMPREHEN METABOLIC PANEL: CPT | Performed by: INTERNAL MEDICINE

## 2022-07-26 PROCEDURE — 63600175 PHARM REV CODE 636 W HCPCS: Performed by: INTERNAL MEDICINE

## 2022-07-26 PROCEDURE — 63600175 PHARM REV CODE 636 W HCPCS: Performed by: NURSE PRACTITIONER

## 2022-07-26 PROCEDURE — 25000003 PHARM REV CODE 250: Performed by: INTERNAL MEDICINE

## 2022-07-26 PROCEDURE — 11000001 HC ACUTE MED/SURG PRIVATE ROOM

## 2022-07-26 PROCEDURE — 25000003 PHARM REV CODE 250: Performed by: NURSE PRACTITIONER

## 2022-07-26 PROCEDURE — 86301 IMMUNOASSAY TUMOR CA 19-9: CPT | Performed by: PHYSICIAN ASSISTANT

## 2022-07-26 PROCEDURE — 36415 COLL VENOUS BLD VENIPUNCTURE: CPT | Performed by: INTERNAL MEDICINE

## 2022-07-26 RX ADMIN — PANTOPRAZOLE SODIUM 40 MG: 40 TABLET, DELAYED RELEASE ORAL at 02:07

## 2022-07-26 RX ADMIN — INSULIN ASPART 2 UNITS: 100 INJECTION, SOLUTION INTRAVENOUS; SUBCUTANEOUS at 05:07

## 2022-07-26 RX ADMIN — ENOXAPARIN SODIUM 40 MG: 40 INJECTION SUBCUTANEOUS at 05:07

## 2022-07-26 RX ADMIN — BUSPIRONE HYDROCHLORIDE 7.5 MG: 5 TABLET ORAL at 05:07

## 2022-07-26 NOTE — PROGRESS NOTES
"Gastroenterology Progress Note    Subjective:    Patient report significant improvement in her abdominal pain. She also mentions that her urine is less dark. HIDA w/ CCK was negative. No plans for surgery.     Objective:    ROS:    ROS      Vital Signs:  /71   Pulse 69   Temp 98.4 °F (36.9 °C) (Oral)   Resp 18   Ht 5' 3" (1.6 m)   Wt 77.6 kg (171 lb)   SpO2 95%   BMI 30.29 kg/m²   Body mass index is 30.29 kg/m².    Physical Exam:    Physical Exam  Constitutional:       General: She is not in acute distress.  Cardiovascular:      Rate and Rhythm: Normal rate and regular rhythm.   Pulmonary:      Effort: Pulmonary effort is normal. No respiratory distress.   Abdominal:      General: Bowel sounds are normal. There is no distension.      Palpations: Abdomen is soft.      Tenderness: There is no abdominal tenderness.   Skin:     General: Skin is warm and dry.   Neurological:      Mental Status: She is alert and oriented to person, place, and time.   Psychiatric:         Mood and Affect: Mood normal.         Labs:  Recent Results (from the past 48 hour(s))   POCT glucose    Collection Time: 07/24/22  8:26 PM   Result Value Ref Range    POCT Glucose 143 (H) 70 - 110 mg/dL   POCT glucose    Collection Time: 07/25/22  4:07 AM   Result Value Ref Range    POCT Glucose 101 70 - 110 mg/dL   Comprehensive Metabolic Panel (CMP)    Collection Time: 07/25/22  4:11 AM   Result Value Ref Range    Sodium Level 143 136 - 145 mmol/L    Potassium Level 4.7 3.5 - 5.1 mmol/L    Chloride 110 (H) 98 - 107 mmol/L    Carbon Dioxide 26 23 - 31 mmol/L    Glucose Level 102 82 - 115 mg/dL    Blood Urea Nitrogen 8.7 (L) 9.8 - 20.1 mg/dL    Creatinine 0.63 0.55 - 1.02 mg/dL    Calcium Level Total 9.0 8.4 - 10.2 mg/dL    Protein Total 6.0 5.8 - 7.6 gm/dL    Albumin Level 2.9 (L) 3.4 - 4.8 gm/dL    Globulin 3.1 2.4 - 3.5 gm/dL    Albumin/Globulin Ratio 0.9 (L) 1.1 - 2.0 ratio    Bilirubin Total 4.0 (H) <=1.5 mg/dL    Alkaline Phosphatase " 320 (H) 40 - 150 unit/L    Alanine Aminotransferase 233 (H) 0 - 55 unit/L    Aspartate Aminotransferase 146 (H) 5 - 34 unit/L    Estimated GFR-Non  >60 mls/min/1.73/m2   CBC with Differential    Collection Time: 07/25/22  4:11 AM   Result Value Ref Range    WBC 5.3 4.5 - 11.5 x10(3)/mcL    RBC 4.10 (L) 4.20 - 5.40 x10(6)/mcL    Hgb 12.1 12.0 - 16.0 gm/dL    Hct 38.9 37.0 - 47.0 %    MCV 94.9 (H) 80.0 - 94.0 fL    MCH 29.5 27.0 - 31.0 pg    MCHC 31.1 (L) 33.0 - 36.0 mg/dL    RDW 13.8 11.5 - 17.0 %    Platelet 152 130 - 400 x10(3)/mcL    MPV 10.1 7.4 - 10.4 fL    Neut % 35.6 %    Lymph % 44.7 %    Mono % 7.7 %    Eos % 10.5 %    Basophil % 0.9 %    Lymph # 2.38 0.6 - 4.6 x10(3)/mcL    Neut # 1.9 (L) 2.1 - 9.2 x10(3)/mcL    Mono # 0.41 0.1 - 1.3 x10(3)/mcL    Eos # 0.56 0 - 0.9 x10(3)/mcL    Baso # 0.05 0 - 0.2 x10(3)/mcL    IG# 0.03 0 - 0.04 x10(3)/mcL    IG% 0.6 %    NRBC% 0.0 %   POCT Glucose, Hand-Held Device    Collection Time: 07/25/22 11:00 AM   Result Value Ref Range    POC Glucose 133 (A) 70 - 110 MG/DL   POCT glucose    Collection Time: 07/25/22 11:40 AM   Result Value Ref Range    POCT Glucose 133 (H) 70 - 110 mg/dL   POCT Glucose, Hand-Held Device    Collection Time: 07/25/22  4:00 PM   Result Value Ref Range    POC Glucose 121 (A) 70 - 110 MG/DL   POCT glucose    Collection Time: 07/25/22  5:36 PM   Result Value Ref Range    POCT Glucose 120 (H) 70 - 110 mg/dL   POCT glucose    Collection Time: 07/25/22  9:39 PM   Result Value Ref Range    POCT Glucose 133 (H) 70 - 110 mg/dL   POCT glucose    Collection Time: 07/26/22  5:22 AM   Result Value Ref Range    POCT Glucose 166 (H) 70 - 110 mg/dL   Comprehensive Metabolic Panel    Collection Time: 07/26/22  5:43 AM   Result Value Ref Range    Sodium Level 140 136 - 145 mmol/L    Potassium Level 4.3 3.5 - 5.1 mmol/L    Chloride 105 98 - 107 mmol/L    Carbon Dioxide 28 23 - 31 mmol/L    Glucose Level 173 (H) 82 - 115 mg/dL    Blood Urea Nitrogen 9.0  (L) 9.8 - 20.1 mg/dL    Creatinine 0.62 0.55 - 1.02 mg/dL    Calcium Level Total 8.9 8.4 - 10.2 mg/dL    Protein Total 6.2 5.8 - 7.6 gm/dL    Albumin Level 2.9 (L) 3.4 - 4.8 gm/dL    Globulin 3.3 2.4 - 3.5 gm/dL    Albumin/Globulin Ratio 0.9 (L) 1.1 - 2.0 ratio    Bilirubin Total 2.5 (H) <=1.5 mg/dL    Alkaline Phosphatase 376 (H) 40 - 150 unit/L    Alanine Aminotransferase 194 (H) 0 - 55 unit/L    Aspartate Aminotransferase 111 (H) 5 - 34 unit/L    Estimated GFR-Non  >60 mls/min/1.73/m2   POCT glucose    Collection Time: 07/26/22 11:17 AM   Result Value Ref Range    POCT Glucose 153 (H) 70 - 110 mg/dL       Imaging:      Assessment/Plan:  1. Abdominal pain    2. Chest pain       62 year old woman with htn, gerd, dm, obesity, who presented to [facility] with RUQ pain, nausea and vomiting and fever of 102.  She states she has had some discomfort over the past week, but because worse yesterday.  She went to [facility] and then was transferred to [facility].      1. Elevated LFT's  2. RUQ pain  3. Pale stool  -Trend LFTs  -MRCP negative for obstruction  -F/u liver serology (NOVA, AMA, etc). Unremarkable acetaminophen level and acute viral hepatitis panel  -HIDA scan grossly normal. No surgical intervention planned  -Only new medication at home was amlodipine, which was started around 1 month ago. This would be very rare to cause hepatic injury. She is on a statin, but has been on this for years without issue. Symptoms started 2 week ago. She occasionally drinks, no foreign travel, sick contacts, herbal supplements, excessive Tylenol, or smoking.  -She asks if she could have passed a gallstone. This would also be very rare to have one single stone that passed since she has no stones seen on US, MRCP, or CT.  -She may be able to go home or may need to stay for further evaluation. Will discuss with Dr. Bruce.     4. Hepatomegaly  5. Hepatic steatosis  -F/u GI as outpatient    Gary Sanches PA-C

## 2022-07-26 NOTE — PROGRESS NOTES
[facility]  Hospital Medicine Progress Note        Chief Complaint: Inpatient Follow-up for jaundice, upper abdominal pain    HPI: 62-year-old female with known past medical history of hypertension, hyperlipidemia, GERD, diabetes mellitus type 2, obesity with BMI 30.2, admitted to [facility] on 07/23/2022 with complaint of upper epigastric and right upper quadrant pain associated with nausea and vomiting and fever of 102° at home.  Patient reported that all her symptoms started last Monday when she started having growing in her stomach but no diarrhea.  Next day she developed upper abdominal pain and right upper quadrant pain with nausea and then she started having fever and vomiting.  Denies any sick contact or any food that she ate recently or any new supplements on medication that she started except for amlodipine that was recently changed from lisinopril for better blood pressure control by her PCP 4 weeks ago.   Acuity, general surgery was consulted and they had a very low suspicion for primary gallbladder pathology given negative gallbladder findings on ultrasound, normal WBC and benign abdominal examination.   Surgery recommended patient be evaluated by GI.  GI service was not available at [facility] till this coming Tuesday so she was transferred to [facility] ER for GI service and evaluation.   Patient states that later that this past week she noticed that her eyes started to become yellow and scan also changed the color, her urine is very dark grayish yellow and she had generalized itching.  Patient denies alcohol use, states that she uses so sparingly or occasionally that it is cannot be quantified.   Reports she has been working hard towards her diabetic control for the last few months and it has been running okay.  She does not know her A1c.   Hepatitis-A panel was done which is negative for a B and C, abdominal ultrasound reveals hepatomegaly and hepatic steatosis without exclusion of  cirrhosis due to heterogenicity, ill-defined hypoechoic area adjacent to gallbladder may suggest fatty infiltrate.  GI service is consulted and patient is admitted to our service for further evaluation     Interval Hx:   Lying in bed, reports she feels much better .  Reports that her urine has started to lighten now.  She feels the jaundice has improved also.   Discuss the result of HIDA scan being negative along with MRCP. Discussed that we will check back with GI as well as General surgery regarding any intervention if needed.  Encouraged patient to hydrate appropriately and adequately   Case discussed with patient's nurse on the floor      Objective/physical exam:  General: In no acute distress, afebrile, obese, scleral icterus with jaundice slightly improved    Chest: Clear to auscultation bilaterally to the bases   Heart: S1, S2, no appreciable murmur   Abdomen:   mild tenderness to palpation in the right upper quadrant, epigastrium.  No tenderness on the left side though   MSK: Warm, no lower extremity edema, no clubbing or cyanosis   Neurologic: Alert and oriented x4, moving all extremities with good strength    Psych:  Appropriate mood and affect, cooperative     VITAL SIGNS: 24 HRS MIN & MAX LAST   Temp  Min: 98 °F (36.7 °C)  Max: 98.4 °F (36.9 °C) 98.2 °F (36.8 °C)   BP  Min: 111/60  Max: 130/75 111/60   Pulse  Min: 57  Max: 67  60   Resp  Min: 18  Max: 18 18   SpO2  Min: 95 %  Max: 97 % 96 %       Recent Labs   Lab 07/23/22  1451 07/25/22  0411   WBC 6.0 5.3   RBC 4.68 4.10*   HGB 13.7 12.1   HCT 43.3 38.9   MCV 92.5 94.9*   MCH 29.3 29.5   MCHC 31.6* 31.1*   RDW 13.2 13.8    152   MPV 9.7 10.1       Recent Labs   Lab 07/24/22  0606 07/25/22  0411 07/26/22  0543    143 140   K 4.3 4.7 4.3   CO2 22* 26 28   BUN 15.8 8.7* 9.0*   CREATININE 0.69 0.63 0.62   CALCIUM 8.5 9.0 8.9   ALBUMIN 2.9* 2.9* 2.9*   ALKPHOS 293* 320* 376*   * 233* 194*   * 146* 111*   BILITOT 4.8* 4.0* 2.5*           Microbiology Results (last 7 days)     Procedure Component Value Units Date/Time    Blood Culture (site 1) [[id number]]  (Normal) Collected: 07/24/22 1018    Order Status: Completed Specimen: Blood Updated: 07/25/22 1203     CULTURE, BLOOD ([facility]) No Growth At 24 Hours    Blood Culture (site 2) [[id number]]  (Normal) Collected: 07/24/22 1018    Order Status: Completed Specimen: Blood Updated: 07/25/22 1203     CULTURE, BLOOD ([facility]) No Growth At 24 Hours           See below for Radiology    Scheduled Med:   busPIRone  7.5 mg Oral Daily    enoxaparin  40 mg Subcutaneous Daily    pantoprazole  40 mg Oral Daily        Continuous Infusions:   lactated ringers 100 mL/hr at 07/25/22 1221        PRN Meds:  acetaminophen, dextrose 10%, dextrose 10%, dextrose 10%, dextrose 10%, diphenhydrAMINE, glucagon (human recombinant), glucose, glucose, glucose, glucose, insulin aspart U-100, melatonin, naloxone, ondansetron, promethazine, simethicone, sodium chloride 0.9%, sodium chloride 0.9%       Assessment/Plan:  Acute liver failure- without hepatic encephalopathy   Acute hepatitis- unknown etiology- Viral hep panel negative    Probable obstructive Jaundice with elevated Alk phos.   Nausea without vomiting-resolved  Abdominal pain- RUQ/ Epigastric - improving  Fever-subjective likely secondary to GI etiology - resolved  Hyperbilirubinemia-likely secondary to acute liver failure/acute hepatitis   Generalized pruritis likely secondary to acute liver failure - resolved  DM type 2 with hyperglycemia   HTN-essential   HLD- on statin therapy  that is on hold due to elevated liver enzymes          Admitted to hospitalist services inpatient on 07/24/2022   CT of the abdomen and pelvis without contrast--> Contracted gallbladder with pericholecystic fluid is seen.  If gallbladder pathology is suspected ultrasound correlation is recommended. Otherwise unremarkable   Consulted GI Service, Dr Bruce evaluated the pt, appreciate recs    General surgery also consulted, ordered HIDA scan--> negative--> no surgical intervention needed per surgery note  MRCP showed nonspecific pericholecystic edema with no gallstones seen.  No biliary ductal dilation.  Hepatomegaly with steatosis  Follow-up on results of NOVA, AMA, actin smooth muscle antibody IgG- pending  Noted alk-phos still trending up but the bili and AST/ALT are trending down slowly  Blood cultures negatives x 48 hours  Case personally discussed with Gary Sanches, awaiting Dr ZAINA Bruce rounding  DC IV fluid and start encourage oral hydration  Accu-Cheks sliding scale   COVID negative  Resumed appropriate home medication for chronic medical condition. Hold antiHTN (BP being on the lower side) and Statin and oral hypoglycemic agents for now   GI prophylaxis with pantoprazole    PRN pain management   PRN anti-emetic therapy   PRN benadryl for itching   Morning CMP ordered        VTE Prophylaxis: Lovenox for DVT prophylaxis and will be advised to be as mobile as possible        Patient condition: Fair        Discharge Planning and Disposition/Anticipated discharge: 1-2 days, pending GI clearance for discharge       All diagnosis and differential diagnosis have been reviewed; assessment and plan has been documented; I have personally reviewed the labs and test results that are presently available; I have reviewed the patients medication list; I have reviewed the consulting providers response and recommendations. I have reviewed or attempted to review medical records based upon their availability    All of the patient's questions have been  addressed and answered. Patient's is agreeable to the above stated plan. I will continue to monitor closely and make adjustments to medical management as needed.  _____________________________________________________________________    Nutrition Status:    Radiology:  MRI MRCP Without Contrast  Narrative: EXAMINATION:  MRI ABDOMEN WITHOUT CONTRAST  MRCP    CLINICAL HISTORY:  Jaundice;    TECHNIQUE:  Multiplanar multisequence MRI of the abdomen performed without gadolinium based IV contrast according to MRCP protocol.    COMPARISON:  CT earlier the same day    FINDINGS:  Liver: Liver is enlarged with the right hepatic lobe measuring 23 cm craniocaudad.  There is moderate hepatic steatosis.  No focal suspicious liver lesion is seen on this noncontrast exam.    Gallbladder and biliary tree: No gallstones are seen.  Gallbladder is largely contracted which accentuates wall thickness.  There is mild pericholecystic edema.  The common bile duct is not significantly dilated, measures approximately 5 mm maximally.    Spleen: Normal.    Pancreas: No peripancreatic inflammation.  The main pancreatic duct is not dilated.    Adrenals: Normal.    Kidneys: No hydronephrosis.  No suspicious renal lesion.    GI tract: No obstruction.    Retroperitoneum: The abdominal aorta is normal in caliber. There is no retroperitoneal adenopathy.    No ascites.  No pleural or pericardial effusion.  Impression: 1. Nonspecific pericholecystic edema with no gallstones seen.  2. No biliary ductal dilatation.  3. Hepatomegaly with steatosis.    Electronically signed by: Andrew Avila  Date:    07/25/2022  Time:    09:43      Parker Machado MD   07/26/2022

## 2022-07-26 NOTE — PROGRESS NOTES
Trauma/Acute Care Surgery   Daily Progress Note     HD#2  POD#* No surgery found *    Subjective  NAEON, underwent HIDA. Grossly normal w/EF on 55.  Pain is stable  Currently NPO  Denies any fever, chills, nausea, vomiting, chest pain, or shortness of breath.     Scheduled Meds:   busPIRone  7.5 mg Oral Daily    enoxaparin  40 mg Subcutaneous Daily    pantoprazole  40 mg Oral Daily       Continuous Infusions:   lactated ringers 100 mL/hr at 07/25/22 1221       PRN Meds:acetaminophen, dextrose 10%, dextrose 10%, dextrose 10%, dextrose 10%, diphenhydrAMINE, glucagon (human recombinant), glucose, glucose, glucose, glucose, insulin aspart U-100, melatonin, naloxone, ondansetron, promethazine, simethicone, sodium chloride 0.9%, sodium chloride 0.9%     Objective  Temp:  [98 °F (36.7 °C)-98.4 °F (36.9 °C)] 98.2 °F (36.8 °C)  Pulse:  [57-67] 60  Resp:  [18] 18  SpO2:  [95 %-97 %] 96 %  BP: (111-130)/(60-76) 111/60     I/O last 3 completed shifts:  In: 1142 [P.O.:1140; I.V.:2]  Out: -   No intake/output data recorded.     GEN: Well-Appearing, NAD.  NEURO: GCS15, AOx3, CN II - XII grossly intact.  RESP: NWOB on room air  CV: RRR  ABD: S,TTP in RUQ,ND.   MSK: Moving all extremities.     Labs  Recent Labs     07/23/22  1451 07/25/22  0411   WBC 6.0 5.3   HGB 13.7 12.1   HCT 43.3 38.9    152   PTT 29.0  --    INR 0.95  --      Recent Labs     07/23/22  1451 07/24/22  0606 07/25/22  0411 07/26/22  0543    142 143 140   K 4.5 4.3 4.7 4.3   CO2 28 22* 26 28   BUN 19.4 15.8 8.7* 9.0*   CREATININE 0.97 0.69 0.63 0.62   CALCIUM 9.7 8.5 9.0 8.9   ALBUMIN 3.4 2.9* 2.9* 2.9*   BILITOT 4.9*  4.9* 4.8* 4.0* 2.5*   * 134* 146* 111*   ALKPHOS 335* 293* 320* 376*   * 234* 233* 194*       Imaging  HIDA: EF 55, grossly normal on personal review. Awaiting final read.     Assessment/Plan  62y F w/pmhx of DM, HTN, and GERD presenting w/RUQ.    - GB visulaized on HIDA. No surgical intervention indicated at this time.  Recommend continued GI work-up for other etiologies of elevated LFTs and Bilirubin.    Gary Clifton MD  [employer] General Surgery      7/26/2022  7:06 AM

## 2022-07-27 VITALS
SYSTOLIC BLOOD PRESSURE: 136 MMHG | OXYGEN SATURATION: 95 % | BODY MASS INDEX: 30.3 KG/M2 | HEIGHT: 63 IN | WEIGHT: 171 LBS | TEMPERATURE: 98 F | HEART RATE: 71 BPM | RESPIRATION RATE: 18 BRPM | DIASTOLIC BLOOD PRESSURE: 83 MMHG

## 2022-07-27 PROBLEM — E11.9 DIABETES MELLITUS, TYPE 2: Status: ACTIVE | Noted: 2022-07-27

## 2022-07-27 PROBLEM — I10 HTN (HYPERTENSION): Status: ACTIVE | Noted: 2022-07-27

## 2022-07-27 PROBLEM — R10.9 ABDOMINAL PAIN: Status: ACTIVE | Noted: 2022-07-27

## 2022-07-27 PROBLEM — K76.0 FATTY LIVER: Status: ACTIVE | Noted: 2022-07-27

## 2022-07-27 PROBLEM — R11.2 INTRACTABLE NAUSEA AND VOMITING: Status: ACTIVE | Noted: 2022-07-27

## 2022-07-27 PROBLEM — R11.2 INTRACTABLE NAUSEA AND VOMITING: Status: RESOLVED | Noted: 2022-07-27 | Resolved: 2022-07-27

## 2022-07-27 PROBLEM — R74.01 TRANSAMINITIS: Status: ACTIVE | Noted: 2022-07-27

## 2022-07-27 PROBLEM — E78.5 HYPERLIPIDEMIA: Status: ACTIVE | Noted: 2022-07-27

## 2022-07-27 PROBLEM — K72.00 ACUTE LIVER FAILURE: Status: ACTIVE | Noted: 2022-07-27

## 2022-07-27 PROBLEM — E66.9 OBESITY (BMI 30-39.9): Status: ACTIVE | Noted: 2022-07-27

## 2022-07-27 PROBLEM — R10.9 ABDOMINAL PAIN: Status: RESOLVED | Noted: 2022-07-27 | Resolved: 2022-07-27

## 2022-07-27 LAB
ALBUMIN SERPL-MCNC: 3 GM/DL (ref 3.4–4.8)
ALBUMIN/GLOB SERPL: 0.9 RATIO (ref 1.1–2)
ALP SERPL-CCNC: 378 UNIT/L (ref 40–150)
ALT SERPL-CCNC: 170 UNIT/L (ref 0–55)
ANA SER QL HEP2 SUBST: NORMAL
AST SERPL-CCNC: 80 UNIT/L (ref 5–34)
BILIRUBIN DIRECT+TOT PNL SERPL-MCNC: 1.7 MG/DL
BUN SERPL-MCNC: 11.8 MG/DL (ref 9.8–20.1)
CALCIUM SERPL-MCNC: 9.3 MG/DL (ref 8.4–10.2)
CHLORIDE SERPL-SCNC: 103 MMOL/L (ref 98–107)
CO2 SERPL-SCNC: 28 MMOL/L (ref 23–31)
CREAT SERPL-MCNC: 0.71 MG/DL (ref 0.55–1.02)
CYTOPLASMIC AB PATTERN SER IF-IMP: POSITIVE
GLOBULIN SER-MCNC: 3.5 GM/DL (ref 2.4–3.5)
GLUCOSE SERPL-MCNC: 209 MG/DL (ref 82–115)
LABORATORY COMMENT REPORT: NORMAL
MITOCHONDRIA M2 AB SER IA-ACNC: 1.4 U
POCT GLUCOSE: 198 MG/DL (ref 70–110)
POCT GLUCOSE: 249 MG/DL (ref 70–110)
POTASSIUM SERPL-SCNC: 4.4 MMOL/L (ref 3.5–5.1)
PROT SERPL-MCNC: 6.5 GM/DL (ref 5.8–7.6)
SODIUM SERPL-SCNC: 139 MMOL/L (ref 136–145)

## 2022-07-27 PROCEDURE — 36415 COLL VENOUS BLD VENIPUNCTURE: CPT | Performed by: INTERNAL MEDICINE

## 2022-07-27 PROCEDURE — 80053 COMPREHEN METABOLIC PANEL: CPT | Performed by: INTERNAL MEDICINE

## 2022-07-27 NOTE — DISCHARGE SUMMARY
[facility] Hospital Medicine Discharge Summary    Admit Date: 7/24/2022  Discharge Date and Time: 7/27/20229:16 AM  Admitting Physician: Hospital medicine   Discharging Physician: Parker Machado MD.  Primary Care Physician: Primary Doctor No  Consults: Gastroenterology and General Surgery    Discharge Diagnoses:  Acute liver failure- without hepatic encephalopathy   Acute hepatitis- unknown etiology- Viral hep panel negative    Probable obstructive Jaundice with elevated Alk phos.   Nausea without vomiting-resolved  Abdominal pain- RUQ/ Epigastric - improving  Fever-subjective likely secondary to GI etiology - resolved  Hyperbilirubinemia-likely secondary to acute liver failure/acute hepatitis   Fatty liver   Generalized pruritis likely secondary to acute liver failure - resolved  DM type 2 with hyperglycemia   HTN-essential   HLD- on statin therapy  that is on hold due to elevated liver enzymes    Hospital Course:   62-year-old female with known past medical history of hypertension, hyperlipidemia, GERD, diabetes mellitus type 2, obesity with BMI 30.2, admitted to [facility] on 07/23/2022 with complaint of upper epigastric and right upper quadrant pain associated with nausea and vomiting and fever of 102° at home.  Patient reported that all her symptoms started last Monday when she started having growing in her stomach but no diarrhea.  Next day she developed upper abdominal pain and right upper quadrant pain with nausea and then she started having fever and vomiting.  Denies any sick contact or any food that she ate recently or any new supplements on medication that she started except for amlodipine that was recently changed from lisinopril for better blood pressure control by her PCP 4 weeks ago.   Acuity, general surgery was consulted and they had a very low suspicion for primary gallbladder pathology given negative gallbladder findings on ultrasound, normal WBC and benign abdominal  examination.   Surgery recommended patient be evaluated by GI.  GI service was not available at [facility] till this coming Tuesday so she was transferred to [facility] for GI service and evaluation.   Patient states that later that this past week she noticed that her eyes started to become yellow and scan also changed the color, her urine is very dark grayish yellow and she had generalized itching.  Patient denies alcohol use, states that she uses so sparingly or occasionally that it is cannot be quantified.   Reports she has been working hard towards her diabetic control for the last few months and it has been running okay.  She does not know her A1c.   Hepatitis-A panel was done which is negative for a B and C, abdominal ultrasound reveals hepatomegaly and hepatic steatosis without exclusion of cirrhosis due to heterogenicity, ill-defined hypoechoic area adjacent to gallbladder may suggest fatty infiltrate.  GI service is consulted and patient is admitted to our service for further evaluation   Admitted to hospitalist services inpatient on 07/24/2022   CT of the abdomen and pelvis without contrast--> Contracted gallbladder with pericholecystic fluid is seen.  If gallbladder pathology is suspected ultrasound correlation is recommended. Otherwise unremarkable   Consulted GI Service, Dr Bruce evaluated the pt, appreciate recs   General surgery also consulted, ordered HIDA scan--> negative--> no surgical intervention needed per surgery note  MRCP showed nonspecific pericholecystic edema with no gallstones seen.  No biliary ductal dilation.  Hepatomegaly with steatosis  Follow-up on results of NOVA, AMA, actin smooth muscle antibody IgG- pending  Also sent out are CMV Abs IgG/IgM, EBV early antigen antibody profile- GI will provide pt with results on her f/u appt  Noted alk-phos still trending up but the bili and AST/ALT are trending down slowly  Blood cultures negatives x 48 hours  Case personally discussed with Gary  Verónica, awaiting Dr ZAINA goldsteining  LESIA IV fluid and start encourage oral hydration  Accu-Cheks sliding scale   COVID negative  Resumed appropriate home medication for chronic medical condition. Hold antiHTN (BP being on the lower side) and Statin and oral hypoglycemic agents for now   GI prophylaxis with pantoprazole    Discussed weight loss, advised to hols atorvastatin till liver enzymes normalizes   GI cleared pt for discharge with F/U with their clinic       Pt was seen and examined on the day of discharge  Vitals:  VITAL SIGNS: 24 HRS MIN & MAX LAST   Temp  Min: 97.8 °F (36.6 °C)  Max: 99.6 °F (37.6 °C) 99.6 °F (37.6 °C)   BP  Min: 115/71  Max: 135/76 124/67   Pulse  Min: 59  Max: 69  69   Resp  Min: 16  Max: 18 18   SpO2  Min: 94 %  Max: 96 % 95 %       Physical Exam:  General: In no acute distress, afebrile, obese, scleral icterus with jaundice slightly improved    Chest: Clear to auscultation bilaterally to the bases   Heart: S1, S2, no appreciable murmur   Abdomen:   mild tenderness to palpation in the right upper quadrant, epigastrium.  No tenderness on the left side though   MSK: Warm, no lower extremity edema, no clubbing or cyanosis   Neurologic: Alert and oriented x4, moving all extremities with good strength    Psych:  Appropriate mood and affect, cooperative     Procedures Performed: No admission procedures for hospital encounter.     Significant Diagnostic Studies: See Full reports for all details    Recent Labs   Lab 07/23/22  1451 07/25/22  0411   WBC 6.0 5.3   RBC 4.68 4.10*   HGB 13.7 12.1   HCT 43.3 38.9   MCV 92.5 94.9*   MCH 29.3 29.5   MCHC 31.6* 31.1*   RDW 13.2 13.8    152   MPV 9.7 10.1       Recent Labs   Lab 07/25/22  0411 07/26/22  0543 07/27/22  0441    140 139   K 4.7 4.3 4.4   CO2 26 28 28   BUN 8.7* 9.0* 11.8   CREATININE 0.63 0.62 0.71   CALCIUM 9.0 8.9 9.3   ALBUMIN 2.9* 2.9* 3.0*   ALKPHOS 320* 376* 378*   * 194* 170*   * 111* 80*   BILITOT  4.0* 2.5* 1.7*        Microbiology Results (last 7 days)     Procedure Component Value Units Date/Time    Blood Culture (site 1) [[id number]]  (Normal) Collected: 07/24/22 1018    Order Status: Completed Specimen: Blood Updated: 07/26/22 1200     CULTURE, BLOOD ([facility]) No Growth At 48 Hours    Blood Culture (site 2) [[id number]]  (Normal) Collected: 07/24/22 1018    Order Status: Completed Specimen: Blood Updated: 07/26/22 1200     CULTURE, BLOOD ([facility]) No Growth At 48 Hours           NM Hepatobiliary Scan (HIDA)  Narrative: EXAMINATION:  NM HEPATOBILIARY IMAGING INC GB (HIDA)    CLINICAL HISTORY:  Concern for cholecystitis;    TECHNIQUE:  7.7 mCi of intravenous Choletec was administered followed by focused gamma camera imaging of the abdomen. 8 ounces of boost was given orally for the purpose of inducing gallbladder contraction.    FINDINGS:  There is prompt hepatocellular uptake. Central biliary activity identified with gallbladder filling by 15 minutes. There is progressive gallbladder filling over 60 minutes. Small bowel activity is identified by 30 minutes progressing through 60 minutes of imaging.    Region of interest was drawn over the gallbladder and counting statistics used to determine the ejection fraction. Ejection fraction was calculated to be 55 %, above the normal range of greater than 35%.  Impression: Negative hepatobiliary scintigraphy with normal gallbladder ejection fraction.    Electronically signed by: Jesse Massey  Date:    07/26/2022  Time:    09:05         Medication List      CONTINUE taking these medications    busPIRone 7.5 MG tablet  Commonly known as: BUSPAR     cetirizine 10 MG tablet  Commonly known as: ZYRTEC     FARXIGA 10 mg tablet  Generic drug: dapagliflozin     glipiZIDE 10 MG tablet  Commonly known as: GLUCOTROL     lisinopriL 40 MG tablet  Commonly known as: PRINIVIL,ZESTRIL     omeprazole 40 MG capsule  Commonly known as: PRILOSEC     SOLIQUA 100/33 100 unit-33 mcg/mL Inpn  pen  Generic drug: insulin glargine-lixisenatide     VITAMIN D3 25 mcg (1,000 unit) capsule  Generic drug: cholecalciferol (vitamin D3)        STOP taking these medications    atorvastatin 40 MG tablet  Commonly known as: LIPITOR             Explained in detail to the patient about the discharge plan, medications, and follow-up visits. Pt understands and agrees with the treatment plan  Discharge Disposition: Home   Discharged Condition: stable  Diet-   Dietary Orders (From admission, onward)     Start     Ordered    07/26/22 1159  Diet diabetic Low Fat, Cardiac  Diet effective now        Question Answer Comment   Diet Modifier: Low Fat    Diet Modifier: Cardiac        07/26/22 1159               Medications Per DC med rec  Activities as tolerated   Follow-up Information     J Frantz Bruce MD. Schedule an appointment as soon as possible for a visit.    Specialty: Gastroenterology  Why: Post hospital discharge- acute liver failure, F/U on NOVA, AMA, actin smooth muscle antibody IgG, CMV ab IgG/IgM, EBV early Ab profile  Contact information:  [address] [place] [id number] [phone]             ESTABLISH WITH PCP Follow up.                     For further questions contact hospitalist office    Discharge time 33 minutes    For worsening symptoms, chest pain, shortness of breath, increased abdominal pain, high grade fever, stroke or stroke like symptoms, immediately go to the nearest Emergency Room or call 911 as soon as possible.      Parker De Jesus M.D on 7/27/2022. at 9:16 AM.

## 2022-07-28 ENCOUNTER — PATIENT OUTREACH (OUTPATIENT)
Dept: ADMINISTRATIVE | Facility: CLINIC | Age: 62
End: 2022-07-28
Payer: COMMERCIAL

## 2022-07-28 NOTE — PROGRESS NOTES
C3 nurse spoke with Livier Jung for a TCC post hospital discharge follow up call. The patient has a scheduled HOSFU appointment with Brady Lance MD on 08/02/2022 @ 0940 am.

## 2022-07-29 LAB
BACTERIA BLD CULT: NORMAL
BACTERIA BLD CULT: NORMAL
CMV IGG SERPL QL IA: POSITIVE
CMV IGM SERPL QL IA: NEGATIVE
EBV NA AB SER QL: POSITIVE
EBV VCA IGG SER QL: POSITIVE
EBV VCA IGM SER QL: NEGATIVE
IMMUNOLOGIST REVIEW: NORMAL

## 2022-08-01 LAB — SMA IGG SER-ACNC: 13.9 U

## 2022-09-16 ENCOUNTER — HISTORICAL (OUTPATIENT)
Dept: ADMINISTRATIVE | Facility: HOSPITAL | Age: 62
End: 2022-09-16
Payer: COMMERCIAL

## 2022-10-10 ENCOUNTER — HOSPITAL ENCOUNTER (OUTPATIENT)
Dept: RADIOLOGY | Facility: HOSPITAL | Age: 62
Discharge: HOME | End: 2022-10-10
Attending: INTERNAL MEDICINE
Payer: COMMERCIAL

## 2022-10-10 VITALS
HEART RATE: 65 BPM | TEMPERATURE: 99 F | SYSTOLIC BLOOD PRESSURE: 124 MMHG | RESPIRATION RATE: 16 BRPM | WEIGHT: 166.44 LBS | OXYGEN SATURATION: 98 % | HEIGHT: 63 IN | BODY MASS INDEX: 29.49 KG/M2 | DIASTOLIC BLOOD PRESSURE: 66 MMHG

## 2022-10-10 DIAGNOSIS — R79.89 ABNORMAL LIVER FUNCTION TEST: ICD-10-CM

## 2022-10-10 LAB
BASOPHILS # BLD AUTO: 0.06 X10(3)/MCL (ref 0–0.2)
BASOPHILS NFR BLD AUTO: 0.7 %
EOSINOPHIL # BLD AUTO: 0.24 X10(3)/MCL (ref 0–0.9)
EOSINOPHIL NFR BLD AUTO: 2.8 %
ERYTHROCYTE [DISTWIDTH] IN BLOOD BY AUTOMATED COUNT: 13 % (ref 11.5–17)
HCT VFR BLD AUTO: 39.5 % (ref 37–47)
HGB BLD-MCNC: 13.1 GM/DL (ref 12–16)
IMM GRANULOCYTES # BLD AUTO: 0.04 X10(3)/MCL (ref 0–0.04)
IMM GRANULOCYTES NFR BLD AUTO: 0.5 %
INR BLD: 0.93 (ref 0–1.3)
LYMPHOCYTES # BLD AUTO: 3.44 X10(3)/MCL (ref 0.6–4.6)
LYMPHOCYTES NFR BLD AUTO: 39.9 %
MCH RBC QN AUTO: 30.7 PG (ref 27–31)
MCHC RBC AUTO-ENTMCNC: 33.2 MG/DL (ref 33–36)
MCV RBC AUTO: 92.5 FL (ref 80–94)
MONOCYTES # BLD AUTO: 0.7 X10(3)/MCL (ref 0.1–1.3)
MONOCYTES NFR BLD AUTO: 8.1 %
NEUTROPHILS # BLD AUTO: 4.1 X10(3)/MCL (ref 2.1–9.2)
NEUTROPHILS NFR BLD AUTO: 48 %
NRBC BLD AUTO-RTO: 0 %
PLATELET # BLD AUTO: 276 X10(3)/MCL (ref 130–400)
PMV BLD AUTO: 9.6 FL (ref 7.4–10.4)
POCT GLUCOSE: 215 MG/DL (ref 70–110)
PROTHROMBIN TIME: 12.4 SECONDS (ref 12.5–14.5)
RBC # BLD AUTO: 4.27 X10(6)/MCL (ref 4.2–5.4)
WBC # SPEC AUTO: 8.6 X10(3)/MCL (ref 4.5–11.5)

## 2022-10-10 PROCEDURE — 47000 NEEDLE BIOPSY OF LIVER PERQ: CPT

## 2022-10-10 PROCEDURE — 85610 PROTHROMBIN TIME: CPT | Performed by: RADIOLOGY

## 2022-10-10 PROCEDURE — 76942 ECHO GUIDE FOR BIOPSY: CPT | Mod: TC

## 2022-10-10 PROCEDURE — 36415 COLL VENOUS BLD VENIPUNCTURE: CPT | Performed by: RADIOLOGY

## 2022-10-10 PROCEDURE — 85025 COMPLETE CBC W/AUTO DIFF WBC: CPT | Performed by: RADIOLOGY

## 2022-10-10 RX ORDER — HYDROXYZINE HYDROCHLORIDE 25 MG/1
1 TABLET, FILM COATED ORAL
COMMUNITY
Start: 2022-09-27

## 2022-10-10 RX ORDER — SERTRALINE HYDROCHLORIDE 50 MG/1
1 TABLET, FILM COATED ORAL DAILY
COMMUNITY
Start: 2022-09-27

## 2022-10-10 RX ORDER — AMOXICILLIN 500 MG
1 CAPSULE ORAL DAILY
COMMUNITY

## 2022-10-10 RX ORDER — FENTANYL CITRATE 50 UG/ML
INJECTION, SOLUTION INTRAMUSCULAR; INTRAVENOUS
Status: DISCONTINUED
Start: 2022-10-10 | End: 2022-10-10 | Stop reason: WASHOUT

## 2022-10-10 RX ORDER — MIDAZOLAM HYDROCHLORIDE 1 MG/ML
INJECTION INTRAMUSCULAR; INTRAVENOUS
Status: DISCONTINUED
Start: 2022-10-10 | End: 2022-10-10 | Stop reason: WASHOUT

## 2022-10-10 RX ORDER — LIDOCAINE HYDROCHLORIDE 20 MG/ML
INJECTION, SOLUTION EPIDURAL; INFILTRATION; INTRACAUDAL; PERINEURAL
Status: DISPENSED
Start: 2022-10-10 | End: 2022-10-10

## 2022-10-10 NOTE — DISCHARGE INSTRUCTIONS
You did not receive any sedation today, no limitations. Just take it easy today. Go to ER if unusual symptoms occur. Remove bandaid in 24 hours, may also take first shower at this time. Do not put another dressing or any lotion, ointment, or powder to site. Watch for signs of bleeding or infection, if bleeding occurs hold pressure & go to ER; if suspected infection call MD's office.

## 2022-10-17 LAB
ESTROGEN SERPL-MCNC: NORMAL PG/ML
INSULIN SERPL-ACNC: NORMAL U[IU]/ML
LAB AP CLINICAL INFORMATION: NORMAL
LAB AP GROSS DESCRIPTION: NORMAL
LAB AP REPORT FOOTNOTES: NORMAL
T3RU NFR SERPL: NORMAL %

## 2023-08-16 LAB
INR PPP: 1
PROTHROMBIN TIME: 12.5 SECONDS (ref 11.4–14)

## 2023-09-15 ENCOUNTER — HOSPITAL ENCOUNTER (OUTPATIENT)
Dept: RADIOLOGY | Facility: HOSPITAL | Age: 63
Discharge: HOME | End: 2023-09-15
Attending: FAMILY MEDICINE
Payer: COMMERCIAL

## 2023-09-15 DIAGNOSIS — Z78.0 ASYMPTOMATIC AGE-RELATED POSTMENOPAUSAL STATE: ICD-10-CM

## 2023-09-15 PROCEDURE — 77080 DXA BONE DENSITY AXIAL: CPT | Mod: TC

## 2023-09-15 PROCEDURE — 77080 DXA BONE DENSITY AXIAL: CPT | Mod: 26,,, | Performed by: RADIOLOGY

## 2023-09-15 PROCEDURE — 77080 DXA BONE DENSITY AXIAL SKELETON 1 OR MORE SITES: ICD-10-PCS | Mod: 26,,, | Performed by: RADIOLOGY

## 2023-12-27 DIAGNOSIS — Z12.31 ENCOUNTER FOR SCREENING MAMMOGRAM FOR MALIGNANT NEOPLASM OF BREAST: Primary | ICD-10-CM

## 2024-01-11 ENCOUNTER — HOSPITAL ENCOUNTER (OUTPATIENT)
Dept: RADIOLOGY | Facility: HOSPITAL | Age: 64
Discharge: HOME | End: 2024-01-11
Attending: FAMILY MEDICINE
Payer: COMMERCIAL

## 2024-01-11 DIAGNOSIS — Z12.31 ENCOUNTER FOR SCREENING MAMMOGRAM FOR MALIGNANT NEOPLASM OF BREAST: ICD-10-CM

## 2024-01-11 PROCEDURE — 77067 SCR MAMMO BI INCL CAD: CPT | Mod: 26,,, | Performed by: RADIOLOGY

## 2024-01-11 PROCEDURE — 77063 BREAST TOMOSYNTHESIS BI: CPT | Mod: 26,,, | Performed by: RADIOLOGY

## 2024-01-11 PROCEDURE — 77067 SCR MAMMO BI INCL CAD: CPT | Mod: TC

## 2025-02-12 ENCOUNTER — HOSPITAL ENCOUNTER (OUTPATIENT)
Dept: RADIOLOGY | Facility: HOSPITAL | Age: 65
Discharge: HOME | End: 2025-02-12
Attending: NURSE PRACTITIONER
Payer: COMMERCIAL

## 2025-02-12 DIAGNOSIS — Z12.31 ENCOUNTER FOR SCREENING MAMMOGRAM FOR BREAST CANCER: ICD-10-CM

## 2025-02-12 PROCEDURE — 77067 SCR MAMMO BI INCL CAD: CPT | Mod: 26,,, | Performed by: RADIOLOGY

## 2025-02-12 PROCEDURE — 77063 BREAST TOMOSYNTHESIS BI: CPT | Mod: 26,,, | Performed by: RADIOLOGY

## 2025-02-12 PROCEDURE — 77063 BREAST TOMOSYNTHESIS BI: CPT | Mod: TC
